# Patient Record
Sex: FEMALE | Race: WHITE | URBAN - METROPOLITAN AREA
[De-identification: names, ages, dates, MRNs, and addresses within clinical notes are randomized per-mention and may not be internally consistent; named-entity substitution may affect disease eponyms.]

---

## 2023-08-28 ENCOUNTER — HOSPITAL ENCOUNTER (INPATIENT)
Age: 58
LOS: 11 days | Discharge: HOME | End: 2023-09-08
Attending: INTERNAL MEDICINE
Payer: MEDICARE

## 2023-08-28 DIAGNOSIS — K65.1 ABSCESS OF ABDOMINAL CAVITY (HCC): Primary | ICD-10-CM

## 2023-08-28 PROBLEM — A41.9 SEPSIS (HCC): Status: ACTIVE | Noted: 2023-08-28

## 2023-08-28 PROCEDURE — 2060000000 HC ICU INTERMEDIATE R&B

## 2023-08-28 RX ORDER — HYDROXYZINE HYDROCHLORIDE 10 MG/1
25 TABLET, FILM COATED ORAL EVERY 8 HOURS PRN
Status: DISCONTINUED | OUTPATIENT
Start: 2023-08-28 | End: 2023-09-08 | Stop reason: HOSPADM

## 2023-08-28 RX ORDER — ACETAMINOPHEN 650 MG/1
650 SUPPOSITORY RECTAL EVERY 6 HOURS PRN
Status: DISCONTINUED | OUTPATIENT
Start: 2023-08-28 | End: 2023-09-08 | Stop reason: HOSPADM

## 2023-08-28 RX ORDER — SODIUM CHLORIDE 9 MG/ML
INJECTION, SOLUTION INTRAVENOUS CONTINUOUS
Status: DISCONTINUED | OUTPATIENT
Start: 2023-08-28 | End: 2023-08-30

## 2023-08-28 RX ORDER — CEFAZOLIN SODIUM IN 0.9 % NACL 2 G/100 ML
2000 PLASTIC BAG, INJECTION (ML) INTRAVENOUS EVERY 8 HOURS
Status: DISCONTINUED | OUTPATIENT
Start: 2023-08-29 | End: 2023-09-08 | Stop reason: HOSPADM

## 2023-08-28 RX ORDER — VENLAFAXINE HYDROCHLORIDE 150 MG/1
150 CAPSULE, EXTENDED RELEASE ORAL DAILY
COMMUNITY

## 2023-08-28 RX ORDER — METRONIDAZOLE 500 MG/100ML
500 INJECTION, SOLUTION INTRAVENOUS EVERY 8 HOURS
Status: DISCONTINUED | OUTPATIENT
Start: 2023-08-29 | End: 2023-08-31

## 2023-08-28 RX ORDER — TOPIRAMATE 100 MG/1
100 TABLET, FILM COATED ORAL 2 TIMES DAILY
Status: DISCONTINUED | OUTPATIENT
Start: 2023-08-29 | End: 2023-09-08 | Stop reason: HOSPADM

## 2023-08-28 RX ORDER — SODIUM CHLORIDE 0.9 % (FLUSH) 0.9 %
5-40 SYRINGE (ML) INJECTION PRN
Status: DISCONTINUED | OUTPATIENT
Start: 2023-08-28 | End: 2023-09-08 | Stop reason: HOSPADM

## 2023-08-28 RX ORDER — PROCHLORPERAZINE EDISYLATE 5 MG/ML
10 INJECTION INTRAMUSCULAR; INTRAVENOUS EVERY 6 HOURS PRN
Status: DISCONTINUED | OUTPATIENT
Start: 2023-08-28 | End: 2023-09-08 | Stop reason: HOSPADM

## 2023-08-28 RX ORDER — SODIUM CHLORIDE 9 MG/ML
INJECTION, SOLUTION INTRAVENOUS PRN
Status: DISCONTINUED | OUTPATIENT
Start: 2023-08-28 | End: 2023-09-08 | Stop reason: HOSPADM

## 2023-08-28 RX ORDER — HYDROXYZINE HYDROCHLORIDE 25 MG/1
25 TABLET, FILM COATED ORAL EVERY 8 HOURS PRN
COMMUNITY

## 2023-08-28 RX ORDER — ATORVASTATIN CALCIUM 10 MG/1
20 TABLET, FILM COATED ORAL DAILY
Status: DISCONTINUED | OUTPATIENT
Start: 2023-08-29 | End: 2023-09-08 | Stop reason: HOSPADM

## 2023-08-28 RX ORDER — PANTOPRAZOLE SODIUM 40 MG/1
40 TABLET, DELAYED RELEASE ORAL DAILY
COMMUNITY

## 2023-08-28 RX ORDER — ENOXAPARIN SODIUM 100 MG/ML
40 INJECTION SUBCUTANEOUS DAILY
Status: DISCONTINUED | OUTPATIENT
Start: 2023-08-29 | End: 2023-08-29

## 2023-08-28 RX ORDER — ATORVASTATIN CALCIUM 20 MG/1
20 TABLET, FILM COATED ORAL DAILY
COMMUNITY

## 2023-08-28 RX ORDER — VENLAFAXINE HYDROCHLORIDE 150 MG/1
150 CAPSULE, EXTENDED RELEASE ORAL NIGHTLY
Status: DISCONTINUED | OUTPATIENT
Start: 2023-08-29 | End: 2023-09-08 | Stop reason: HOSPADM

## 2023-08-28 RX ORDER — SODIUM CHLORIDE 0.9 % (FLUSH) 0.9 %
5-40 SYRINGE (ML) INJECTION EVERY 12 HOURS SCHEDULED
Status: DISCONTINUED | OUTPATIENT
Start: 2023-08-28 | End: 2023-09-08 | Stop reason: HOSPADM

## 2023-08-28 RX ORDER — ACETAMINOPHEN 325 MG/1
650 TABLET ORAL EVERY 6 HOURS PRN
Status: DISCONTINUED | OUTPATIENT
Start: 2023-08-28 | End: 2023-09-08 | Stop reason: HOSPADM

## 2023-08-28 RX ORDER — TOPIRAMATE 100 MG/1
100 TABLET, FILM COATED ORAL 2 TIMES DAILY
COMMUNITY

## 2023-08-28 RX ORDER — PANTOPRAZOLE SODIUM 40 MG/10ML
40 INJECTION, POWDER, LYOPHILIZED, FOR SOLUTION INTRAVENOUS DAILY
Status: DISCONTINUED | OUTPATIENT
Start: 2023-08-28 | End: 2023-09-08 | Stop reason: HOSPADM

## 2023-08-28 RX ORDER — HYDROXYCHLOROQUINE SULFATE 200 MG/1
200 TABLET, FILM COATED ORAL 2 TIMES DAILY
COMMUNITY

## 2023-08-28 RX ORDER — FENTANYL CITRATE 50 UG/ML
50 INJECTION, SOLUTION INTRAMUSCULAR; INTRAVENOUS
Status: DISCONTINUED | OUTPATIENT
Start: 2023-08-28 | End: 2023-09-08 | Stop reason: HOSPADM

## 2023-08-28 ASSESSMENT — PAIN DESCRIPTION - LOCATION: LOCATION: ABDOMEN

## 2023-08-28 ASSESSMENT — PAIN SCALES - GENERAL: PAINLEVEL_OUTOF10: 7

## 2023-08-28 ASSESSMENT — PAIN DESCRIPTION - PAIN TYPE: TYPE: ACUTE PAIN

## 2023-08-29 ENCOUNTER — APPOINTMENT (OUTPATIENT)
Dept: CT IMAGING | Age: 58
End: 2023-08-29
Attending: INTERNAL MEDICINE
Payer: MEDICARE

## 2023-08-29 PROBLEM — K65.1 ABSCESS OF ABDOMINAL CAVITY (HCC): Status: ACTIVE | Noted: 2023-08-29

## 2023-08-29 LAB
ALBUMIN SERPL-MCNC: 2.7 G/DL (ref 3.4–5)
ALBUMIN SERPL-MCNC: 2.7 G/DL (ref 3.4–5)
ALBUMIN/GLOB SERPL: 0.9 {RATIO} (ref 1.1–2.2)
ALBUMIN/GLOB SERPL: 0.9 {RATIO} (ref 1.1–2.2)
ALP SERPL-CCNC: 83 U/L (ref 40–129)
ALP SERPL-CCNC: 90 U/L (ref 40–129)
ALT SERPL-CCNC: 7 U/L (ref 10–40)
ALT SERPL-CCNC: 8 U/L (ref 10–40)
ANION GAP SERPL CALCULATED.3IONS-SCNC: 10 MMOL/L (ref 3–16)
ANION GAP SERPL CALCULATED.3IONS-SCNC: 11 MMOL/L (ref 3–16)
AST SERPL-CCNC: 10 U/L (ref 15–37)
AST SERPL-CCNC: 11 U/L (ref 15–37)
BASOPHILS # BLD: 0.1 K/UL (ref 0–0.2)
BASOPHILS NFR BLD: 0.9 %
BILIRUB SERPL-MCNC: 0.4 MG/DL (ref 0–1)
BILIRUB SERPL-MCNC: 0.5 MG/DL (ref 0–1)
BUN SERPL-MCNC: 8 MG/DL (ref 7–20)
BUN SERPL-MCNC: 9 MG/DL (ref 7–20)
CALCIUM SERPL-MCNC: 8.2 MG/DL (ref 8.3–10.6)
CALCIUM SERPL-MCNC: 8.5 MG/DL (ref 8.3–10.6)
CHLORIDE SERPL-SCNC: 110 MMOL/L (ref 99–110)
CHLORIDE SERPL-SCNC: 112 MMOL/L (ref 99–110)
CO2 SERPL-SCNC: 18 MMOL/L (ref 21–32)
CO2 SERPL-SCNC: 20 MMOL/L (ref 21–32)
CREAT SERPL-MCNC: 0.6 MG/DL (ref 0.6–1.1)
CREAT SERPL-MCNC: <0.5 MG/DL (ref 0.6–1.1)
DEPRECATED RDW RBC AUTO: 13.4 % (ref 12.4–15.4)
EOSINOPHIL # BLD: 0.1 K/UL (ref 0–0.6)
EOSINOPHIL NFR BLD: 0.5 %
FOLATE SERPL-MCNC: 11.03 NG/ML (ref 4.78–24.2)
GFR SERPLBLD CREATININE-BSD FMLA CKD-EPI: >60 ML/MIN/{1.73_M2}
GFR SERPLBLD CREATININE-BSD FMLA CKD-EPI: >60 ML/MIN/{1.73_M2}
GLUCOSE SERPL-MCNC: 79 MG/DL (ref 70–99)
GLUCOSE SERPL-MCNC: 82 MG/DL (ref 70–99)
HCT VFR BLD AUTO: 29.9 % (ref 36–48)
HCT VFR BLD AUTO: 30 % (ref 36–48)
HCT VFR BLD AUTO: 30.3 % (ref 36–48)
HCT VFR BLD AUTO: 31.5 % (ref 36–48)
HCT VFR BLD AUTO: 31.9 % (ref 36–48)
HGB BLD-MCNC: 10 G/DL (ref 12–16)
HGB BLD-MCNC: 10.2 G/DL (ref 12–16)
HGB BLD-MCNC: 10.8 G/DL (ref 12–16)
INR PPP: 1.36 (ref 0.84–1.16)
LACTATE BLDV-SCNC: 0.8 MMOL/L (ref 0.4–1.9)
LACTATE BLDV-SCNC: 1.2 MMOL/L (ref 0.4–1.9)
LYMPHOCYTES # BLD: 0.6 K/UL (ref 1–5.1)
LYMPHOCYTES NFR BLD: 4 %
MCH RBC QN AUTO: 35.1 PG (ref 26–34)
MCHC RBC AUTO-ENTMCNC: 33.5 G/DL (ref 31–36)
MCV RBC AUTO: 104.9 FL (ref 80–100)
MONOCYTES # BLD: 0.5 K/UL (ref 0–1.3)
MONOCYTES NFR BLD: 3.7 %
NEUTROPHILS # BLD: 13 K/UL (ref 1.7–7.7)
NEUTROPHILS NFR BLD: 90.9 %
PLATELET # BLD AUTO: 274 K/UL (ref 135–450)
PMV BLD AUTO: 7.9 FL (ref 5–10.5)
POTASSIUM SERPL-SCNC: 3.6 MMOL/L (ref 3.5–5.1)
POTASSIUM SERPL-SCNC: 3.6 MMOL/L (ref 3.5–5.1)
PROCALCITONIN SERPL IA-MCNC: 0.27 NG/ML (ref 0–0.15)
PROT SERPL-MCNC: 5.7 G/DL (ref 6.4–8.2)
PROT SERPL-MCNC: 5.7 G/DL (ref 6.4–8.2)
PROTHROMBIN TIME: 16.7 SEC (ref 11.5–14.8)
RBC # BLD AUTO: 2.85 M/UL (ref 4–5.2)
SODIUM SERPL-SCNC: 139 MMOL/L (ref 136–145)
SODIUM SERPL-SCNC: 142 MMOL/L (ref 136–145)
VIT B12 SERPL-MCNC: >2000 PG/ML (ref 211–911)
WBC # BLD AUTO: 14.3 K/UL (ref 4–11)

## 2023-08-29 PROCEDURE — 6360000002 HC RX W HCPCS: Performed by: NURSE PRACTITIONER

## 2023-08-29 PROCEDURE — 85025 COMPLETE CBC W/AUTO DIFF WBC: CPT

## 2023-08-29 PROCEDURE — 87040 BLOOD CULTURE FOR BACTERIA: CPT

## 2023-08-29 PROCEDURE — 87070 CULTURE OTHR SPECIMN AEROBIC: CPT

## 2023-08-29 PROCEDURE — 99152 MOD SED SAME PHYS/QHP 5/>YRS: CPT

## 2023-08-29 PROCEDURE — 6360000004 HC RX CONTRAST MEDICATION

## 2023-08-29 PROCEDURE — 87075 CULTR BACTERIA EXCEPT BLOOD: CPT

## 2023-08-29 PROCEDURE — 80053 COMPREHEN METABOLIC PANEL: CPT

## 2023-08-29 PROCEDURE — 77012 CT SCAN FOR NEEDLE BIOPSY: CPT

## 2023-08-29 PROCEDURE — 82607 VITAMIN B-12: CPT

## 2023-08-29 PROCEDURE — 2060000000 HC ICU INTERMEDIATE R&B

## 2023-08-29 PROCEDURE — C9113 INJ PANTOPRAZOLE SODIUM, VIA: HCPCS | Performed by: NURSE PRACTITIONER

## 2023-08-29 PROCEDURE — 6360000004 HC RX CONTRAST MEDICATION: Performed by: EMERGENCY MEDICINE

## 2023-08-29 PROCEDURE — 87077 CULTURE AEROBIC IDENTIFY: CPT

## 2023-08-29 PROCEDURE — 87205 SMEAR GRAM STAIN: CPT

## 2023-08-29 PROCEDURE — 85014 HEMATOCRIT: CPT

## 2023-08-29 PROCEDURE — 0W9G3ZX DRAINAGE OF PERITONEAL CAVITY, PERCUTANEOUS APPROACH, DIAGNOSTIC: ICD-10-PCS | Performed by: STUDENT IN AN ORGANIZED HEALTH CARE EDUCATION/TRAINING PROGRAM

## 2023-08-29 PROCEDURE — 83605 ASSAY OF LACTIC ACID: CPT

## 2023-08-29 PROCEDURE — 85610 PROTHROMBIN TIME: CPT

## 2023-08-29 PROCEDURE — 2580000003 HC RX 258: Performed by: NURSE PRACTITIONER

## 2023-08-29 PROCEDURE — 36415 COLL VENOUS BLD VENIPUNCTURE: CPT

## 2023-08-29 PROCEDURE — 6370000000 HC RX 637 (ALT 250 FOR IP): Performed by: NURSE PRACTITIONER

## 2023-08-29 PROCEDURE — 74177 CT ABD & PELVIS W/CONTRAST: CPT

## 2023-08-29 PROCEDURE — 84145 PROCALCITONIN (PCT): CPT

## 2023-08-29 PROCEDURE — 85018 HEMOGLOBIN: CPT

## 2023-08-29 PROCEDURE — 99222 1ST HOSP IP/OBS MODERATE 55: CPT | Performed by: SURGERY

## 2023-08-29 PROCEDURE — 6360000002 HC RX W HCPCS: Performed by: STUDENT IN AN ORGANIZED HEALTH CARE EDUCATION/TRAINING PROGRAM

## 2023-08-29 PROCEDURE — 82746 ASSAY OF FOLIC ACID SERUM: CPT

## 2023-08-29 PROCEDURE — 6360000002 HC RX W HCPCS: Performed by: INTERNAL MEDICINE

## 2023-08-29 PROCEDURE — 86403 PARTICLE AGGLUT ANTBDY SCRN: CPT

## 2023-08-29 PROCEDURE — 87186 SC STD MICRODIL/AGAR DIL: CPT

## 2023-08-29 RX ORDER — KETOROLAC TROMETHAMINE 30 MG/ML
15 INJECTION, SOLUTION INTRAMUSCULAR; INTRAVENOUS ONCE
Status: COMPLETED | OUTPATIENT
Start: 2023-08-29 | End: 2023-08-29

## 2023-08-29 RX ORDER — OXYCODONE HYDROCHLORIDE AND ACETAMINOPHEN 5; 325 MG/1; MG/1
1 TABLET ORAL EVERY 4 HOURS PRN
Status: DISCONTINUED | OUTPATIENT
Start: 2023-08-29 | End: 2023-09-08 | Stop reason: HOSPADM

## 2023-08-29 RX ORDER — MIDAZOLAM HYDROCHLORIDE 1 MG/ML
INJECTION INTRAMUSCULAR; INTRAVENOUS PRN
Status: COMPLETED | OUTPATIENT
Start: 2023-08-29 | End: 2023-08-29

## 2023-08-29 RX ORDER — FENTANYL CITRATE 50 UG/ML
INJECTION, SOLUTION INTRAMUSCULAR; INTRAVENOUS PRN
Status: COMPLETED | OUTPATIENT
Start: 2023-08-29 | End: 2023-08-29

## 2023-08-29 RX ADMIN — METRONIDAZOLE 500 MG: 500 INJECTION, SOLUTION INTRAVENOUS at 00:21

## 2023-08-29 RX ADMIN — VENLAFAXINE HYDROCHLORIDE 150 MG: 150 CAPSULE, EXTENDED RELEASE ORAL at 20:34

## 2023-08-29 RX ADMIN — MIDAZOLAM 0.5 MG: 1 INJECTION INTRAMUSCULAR; INTRAVENOUS at 16:00

## 2023-08-29 RX ADMIN — ACETAMINOPHEN 650 MG: 325 TABLET ORAL at 04:09

## 2023-08-29 RX ADMIN — KETOROLAC TROMETHAMINE 15 MG: 30 INJECTION, SOLUTION INTRAMUSCULAR at 08:10

## 2023-08-29 RX ADMIN — Medication 10 ML: at 00:05

## 2023-08-29 RX ADMIN — METRONIDAZOLE 500 MG: 500 INJECTION, SOLUTION INTRAVENOUS at 23:50

## 2023-08-29 RX ADMIN — DIATRIZOATE MEGLUMINE AND DIATRIZOATE SODIUM 12 ML: 660; 100 LIQUID ORAL; RECTAL at 09:30

## 2023-08-29 RX ADMIN — IOPAMIDOL 75 ML: 755 INJECTION, SOLUTION INTRAVENOUS at 11:14

## 2023-08-29 RX ADMIN — OXYCODONE AND ACETAMINOPHEN 1 TABLET: 5; 325 TABLET ORAL at 04:09

## 2023-08-29 RX ADMIN — CEFAZOLIN 2000 MG: 10 INJECTION, POWDER, FOR SOLUTION INTRAVENOUS at 01:41

## 2023-08-29 RX ADMIN — SODIUM CHLORIDE, PRESERVATIVE FREE 10 ML: 5 INJECTION INTRAVENOUS at 00:03

## 2023-08-29 RX ADMIN — CEFAZOLIN 2000 MG: 10 INJECTION, POWDER, FOR SOLUTION INTRAVENOUS at 17:40

## 2023-08-29 RX ADMIN — PANTOPRAZOLE SODIUM 40 MG: 40 INJECTION, POWDER, FOR SOLUTION INTRAVENOUS at 09:35

## 2023-08-29 RX ADMIN — SODIUM CHLORIDE: 9 INJECTION, SOLUTION INTRAVENOUS at 00:05

## 2023-08-29 RX ADMIN — SODIUM CHLORIDE, PRESERVATIVE FREE 10 ML: 5 INJECTION INTRAVENOUS at 09:48

## 2023-08-29 RX ADMIN — ATORVASTATIN CALCIUM 20 MG: 10 TABLET, FILM COATED ORAL at 09:40

## 2023-08-29 RX ADMIN — FENTANYL CITRATE 25 MCG: 50 INJECTION, SOLUTION INTRAMUSCULAR; INTRAVENOUS at 16:00

## 2023-08-29 RX ADMIN — PANTOPRAZOLE SODIUM 40 MG: 40 INJECTION, POWDER, FOR SOLUTION INTRAVENOUS at 00:05

## 2023-08-29 RX ADMIN — PROCHLORPERAZINE EDISYLATE 10 MG: 5 INJECTION INTRAMUSCULAR; INTRAVENOUS at 23:52

## 2023-08-29 RX ADMIN — METRONIDAZOLE 500 MG: 500 INJECTION, SOLUTION INTRAVENOUS at 09:36

## 2023-08-29 RX ADMIN — METRONIDAZOLE 500 MG: 500 INJECTION, SOLUTION INTRAVENOUS at 17:42

## 2023-08-29 RX ADMIN — FENTANYL CITRATE 50 MCG: 50 INJECTION INTRAMUSCULAR; INTRAVENOUS at 01:45

## 2023-08-29 RX ADMIN — TOPIRAMATE 100 MG: 100 TABLET, FILM COATED ORAL at 00:07

## 2023-08-29 RX ADMIN — CEFAZOLIN 2000 MG: 10 INJECTION, POWDER, FOR SOLUTION INTRAVENOUS at 23:51

## 2023-08-29 RX ADMIN — OXYCODONE AND ACETAMINOPHEN 1 TABLET: 5; 325 TABLET ORAL at 17:48

## 2023-08-29 RX ADMIN — TOPIRAMATE 100 MG: 100 TABLET, FILM COATED ORAL at 20:34

## 2023-08-29 RX ADMIN — CEFAZOLIN 2000 MG: 10 INJECTION, POWDER, FOR SOLUTION INTRAVENOUS at 09:48

## 2023-08-29 RX ADMIN — OXYCODONE AND ACETAMINOPHEN 1 TABLET: 5; 325 TABLET ORAL at 09:40

## 2023-08-29 RX ADMIN — MIDAZOLAM 0.5 MG: 1 INJECTION INTRAMUSCULAR; INTRAVENOUS at 16:04

## 2023-08-29 RX ADMIN — TOPIRAMATE 100 MG: 100 TABLET, FILM COATED ORAL at 09:40

## 2023-08-29 RX ADMIN — SODIUM CHLORIDE: 9 INJECTION, SOLUTION INTRAVENOUS at 14:58

## 2023-08-29 RX ADMIN — FENTANYL CITRATE 25 MCG: 50 INJECTION, SOLUTION INTRAMUSCULAR; INTRAVENOUS at 16:03

## 2023-08-29 RX ADMIN — VENLAFAXINE HYDROCHLORIDE 150 MG: 150 CAPSULE, EXTENDED RELEASE ORAL at 00:07

## 2023-08-29 ASSESSMENT — PAIN DESCRIPTION - ORIENTATION
ORIENTATION: RIGHT;LOWER

## 2023-08-29 ASSESSMENT — PAIN SCALES - GENERAL
PAINLEVEL_OUTOF10: 7
PAINLEVEL_OUTOF10: 10
PAINLEVEL_OUTOF10: 5
PAINLEVEL_OUTOF10: 8
PAINLEVEL_OUTOF10: 10
PAINLEVEL_OUTOF10: 8
PAINLEVEL_OUTOF10: 8

## 2023-08-29 ASSESSMENT — PAIN DESCRIPTION - LOCATION
LOCATION: ABDOMEN

## 2023-08-29 ASSESSMENT — PAIN DESCRIPTION - FREQUENCY: FREQUENCY: CONTINUOUS

## 2023-08-29 ASSESSMENT — PAIN DESCRIPTION - PAIN TYPE
TYPE: ACUTE PAIN

## 2023-08-29 ASSESSMENT — PAIN DESCRIPTION - ONSET: ONSET: ON-GOING

## 2023-08-29 ASSESSMENT — PAIN DESCRIPTION - DESCRIPTORS: DESCRIPTORS: SHARP

## 2023-08-29 NOTE — CONSULTS
Department of General Surgery Consult    PATIENT NAME: Tamra Ellington   YOB: 1965    ADMISSION DATE: 8/28/2023 10:49 PM      TODAY'S DATE: 8/29/2023    Reason for Consult:  Possible abdominal abscess    Chief Complaint: RLQ abdominal pain    Historian: patient    Requesting Physician:  Corrine    HISTORY OF PRESENT ILLNESS:              The patient is a 63 y.o. female who presents with 3 day h/o lower abdominal pain. Pain has been centered in RLQ with radiation along the flank/RUQ. Has been nauseated, anorexic but no fever, chills, emesis or jaundice. No prior h/o similar pain. Patient had EDISON/BSO >15 years ago, also lap irvin, and gastric sleeve. States she was treated for \"pancreatitis\" just a few weeks ago. Seen in ER at [facility] yesterday - workup showed mild/mod leukocytosis, and CT showed apparent cystic structure in RLQ without a grossly visible appendix. Patient continues to complain of pain today.     Past Medical History:        Diagnosis Date    Anxiety     Chronic recurrent pancreatitis ([facility])     Depression     Fibromyalgia     Lupus ([address])     Osteoarthritis        Past Surgical History:        Procedure Laterality Date    CHOLECYSTECTOMY      HYSTERECTOMY (CERVIX STATUS UNKNOWN)      JOINT REPLACEMENT         Current Medications:   Current Facility-Administered Medications: oxyCODONE-acetaminophen (PERCOCET) 5-325 MG per tablet 1 tablet, 1 tablet, Oral, Q4H PRN  sodium chloride flush 0.9 % injection 5-40 mL, 5-40 mL, IntraVENous, 2 times per day  sodium chloride flush 0.9 % injection 5-40 mL, 5-40 mL, IntraVENous, PRN  0.9 % sodium chloride infusion, , IntraVENous, PRN  acetaminophen (TYLENOL) tablet 650 mg, 650 mg, Oral, Q6H PRN **OR** acetaminophen (TYLENOL) suppository 650 mg, 650 mg, Rectal, Q6H PRN  0.9 % sodium chloride infusion, , IntraVENous, Continuous  pantoprazole (PROTONIX) injection 40 mg, 40 mg, IntraVENous, Daily  fentaNYL (SUBLIMAZE) injection 50 mcg, 50

## 2023-08-29 NOTE — OR NURSING
Image guided abscess aspiration RLQ completed. Dr. Mik Rangel  25 milliliters of tain colored withdrawn immediately. Specimen sent to lab for culture. dressing clean, dry, and intact. Pt tolerated procedure without any signs or symptoms of distress. Vital signs stable. Report called to RN. Pt transported back to 421 in stable condition via bed. Medications  Versed: 1 mg  Fentanyl: 50 mcg    Vital Signs  Vitals:    08/29/23 1610   BP: 131/66   Pulse: 83   Resp: 22   Temp:    SpO2: 99%    (vital signs in table format)    Post Fartun  2 - Able to move 4 extremities voluntarily on command  2 - BP+/- 20mmHg of normal  2 - Fully awake  2 - Able to maintain oxygen saturation >92% on room air  2 - Able to breathe deeply and cough freely    This RN wasted the following with Kasandra.   1 mg Versed  50 mcg Fentanyl

## 2023-08-29 NOTE — BRIEF OP NOTE
Brief Postoperative Note    Patient: Ammon Broussard  YOB: 1965  MRN: [id number]      Pre-operative Diagnosis: RLQ abscess    Post-operative Diagnosis: Same    Procedure: CT guided fluid aspiration    Anesthesia: Local and Moderate Sedation    Surgeons/Assistants: Joey Tompkins DO    Estimated Blood Loss: less than 50     Complications: None    Specimens: Was Obtained: 25 mL of creamy purulent fluid, sent for culture    Findings:   Successful CT guided RLQ fluid aspiration, the cavity was completely evacuated of fluid, therefore no drain was left behind. Findings relayed to the patient and referring provider ( Dr Christiano Treadwell).        Joey Tompkins DO  8/29/2023, 4:15 PM  Interventional Radiology  [phone] ([phone])

## 2023-08-29 NOTE — PROGRESS NOTES
Hospital Medicine Progress Note      Date of Admission: 8/28/2023  Hospital Day: 2    Chief Admission Complaint:  Direct admit from [facility]); pelvic abscess        Subjective:   notes ongoing abd pain, going down for CT now    Presenting Admission History:       Ana Britt is a/an 63 y.o. female with a significant past medical history of GERD, lupus, fibromyalgia, hyperlipidemia, with chronic recurrent pancreatitis last known episode about 3 weeks ago presents to [facility] as a direct admit from [facility]) after presenting there earlier today with complaint of right lower quadrant pain for the last 2 days. She notes the pain is severe, 8 out of 10, sharp stabbing pain worse with movement, associated with chills and body aches yesterday and today. She notes nausea without any vomiting, no diarrhea. She notes chronic constipation with last bowel movement yesterday that was soft, light brown without visualized blood. She denies any prior vaginal bleeding or discharge before onset of this RLQ pain. She had a follow-up appointment with her PCP today for inpatient stay at [facility] 2 weeks ago for pancreatitis, provider noted significant right lower quadrant pain and sent her back to [facility] emergency department for suspected appendicitis. Her evaluation included laboratory studies and CT of the abdomen pelvis. CT there did not show any acute hepatitis but did show a cystic structure in the pelvic inlet on the right anterior of the iliac vessels with some dependent free fluid; concerning for ovarian cysts remnants, measuring 4.7 cm. Pertinent laboratory studies includes cell count with a WBC of 18.3, hemoglobin 11.8, MCH 34. Urinalysis was negative for acute findings. INR is 1.1. Chemistry panel showed sodium 134, creatinine 0.9, blood sugar 116.   Patient was treated with ertapenem IV piggyback in the emergency ---------------------------------------------------------------------  B. Risk of Treatment (any 1)   [] Drugs/treatments that require intensive monitoring for toxicity include:    [] Change in code status:    [] Decision to escalate care:    [] Major surgery/procedure with associated risk factors:    ----------------------------------------------------------------------  C. Data (any 2)  [x] Discussed management of the case with:  gen surg, pt's nurse  [] Imaging personally reviewed and interpreted, includes:    [x] Data Review (any 3)  [] Collateral history obtained from:    [x] All available Consultant notes from yesterday/today were reviewed  [x] All current labs were reviewed and interpreted for clinical significance   [x] Appropriate follow-up labs were ordered    Medications:  Personally reviewed in detail in conjunction w/ labs as documented for evidence of drug toxicity. Infusion Medications    sodium chloride      sodium chloride 100 mL/hr at 08/29/23 1458     Scheduled Medications    sodium chloride flush  5-40 mL IntraVENous 2 times per day    pantoprazole  40 mg IntraVENous Daily    ceFAZolin  2,000 mg IntraVENous Q8H    metroNIDAZOLE  500 mg IntraVENous Q8H    atorvastatin  20 mg Oral Daily    topiramate  100 mg Oral BID    venlafaxine  150 mg Oral Nightly     PRN Meds: oxyCODONE-acetaminophen, sodium chloride flush, sodium chloride, acetaminophen **OR** acetaminophen, fentanNYL, prochlorperazine, hydrOXYzine HCl     Labs:  Personally reviewed and interpreted for clinical significance. Recent Labs     08/29/23  0004 08/29/23  0434 08/29/23  0932 08/29/23  1434   WBC 14.3*  --   --   --    HGB 10.0* 10.2* 10.2* 10.8*   HCT 29.9* 31.5* 30.0* 31.9*     --   --   --      Recent Labs     08/29/23  0004 08/29/23  0435    142   K 3.6 3.6    112*   CO2 18* 20*   BUN 9 8   CREATININE <0.5* 0.6   CALCIUM 8.2* 8.5     No results for input(s): PROBNP, TROPHS in the last 72 hours.   No

## 2023-08-29 NOTE — PLAN OF CARE
Problem: Discharge Planning  Goal: Discharge to home or other facility with appropriate resources  Outcome: Progressing     Problem: Pain  Goal: Verbalizes/displays adequate comfort level or baseline comfort level  Outcome: Progressing     Problem: Cardiovascular - Adult  Goal: Maintains optimal cardiac output and hemodynamic stability  Outcome: Progressing     Problem: Musculoskeletal - Adult  Goal: Return mobility to safest level of function  Outcome: Progressing  Goal: Return ADL status to a safe level of function  Outcome: Progressing

## 2023-08-29 NOTE — OR NURSING
Pt arrived for image guided abscess drain insertion RLQ . Procedure explained including the risk and benefits of the procedure. All questions answered. Pt verbalizes understanding of the procedure and states no more questions. Consent confirmed. Vital signs stable. Labs, allergies, medications, and code status reviewed. No contraindications noted. Vital Signs  Vitals:    08/29/23 1557   BP: 130/68   Pulse: 82   Resp: 17   Temp:    SpO2: 100%    (vital signs in table format)    Pre Fartun Score  2 - Able to move 4 extremities voluntarily on command  2 - BP+/- 20mmHg of normal  2 - Fully awake  2 - Able to maintain oxygen saturation >92% on room air  2 - Able to breathe deeply and cough freely    Allergies  Patient has no known allergies.  (allergies)    Labs  Lab Results   Component Value Date    INR 1.36 (H) 08/29/2023    PROTIME 16.7 (H) 08/29/2023     Lab Results   Component Value Date    CREATININE 0.6 08/29/2023    BUN 8 08/29/2023     08/29/2023    K 3.6 08/29/2023     (H) 08/29/2023    CO2 20 (L) 08/29/2023     Lab Results   Component Value Date    WBC 14.3 (H) 08/29/2023    HGB 10.8 (L) 08/29/2023    HCT 31.9 (L) 08/29/2023    .9 (H) 08/29/2023     08/29/2023

## 2023-08-29 NOTE — H&P
Hospital Medicine History & Physical        Date of Service: 8/28/2023    Time of Service: 2315    Disposition:    [x]Admitted to inpatient status with expected LOS greater than two midnights due to medical therapy. []Placed in observation status. Chief Admission Complaint:  Direct admit from [facility]); pelvic abscess    Presenting Admission History:      Alecia Escobedo is a/an 63 y.o. female with a significant past medical history of GERD, lupus, fibromyalgia, hyperlipidemia, with chronic recurrent pancreatitis last known episode about 3 weeks ago presents to [facility] as a direct admit from [facility]) after presenting there earlier today with complaint of right lower quadrant pain for the last 2 days. She notes the pain is severe, 8 out of 10, sharp stabbing pain worse with movement, associated with chills and body aches yesterday and today. She notes nausea without any vomiting, no diarrhea. She notes chronic constipation with last bowel movement yesterday that was soft, light brown without visualized blood. She denies any prior vaginal bleeding or discharge before onset of this RLQ pain. She had a follow-up appointment with her PCP today for inpatient stay at [facility] 2 weeks ago for pancreatitis, provider noted significant right lower quadrant pain and sent her back to [facility] emergency department for suspected appendicitis. Her evaluation included laboratory studies and CT of the abdomen pelvis. CT there did not show any acute hepatitis but did show a cystic structure in the pelvic inlet on the right anterior of the iliac vessels with some dependent free fluid; concerning for ovarian cysts remnants, measuring 4.7 cm. Pertinent laboratory studies includes cell count with a WBC of 18.3, hemoglobin 11.8, MCH 34. Urinalysis was negative for acute findings. INR is 1.1.   Chemistry panel showed sodium 134, creatinine [x]No    --------------------------------------------------------------------------------------------------------------------------------------------------------------------    Imaging:     No results found. PCP: Referring Not In System (Inactive)    Past Medical History:        Diagnosis Date    Anxiety     Chronic recurrent pancreatitis ([address])     Depression     Fibromyalgia     Lupus ([address])     Osteoarthritis        Past Surgical History:        Procedure Laterality Date    CHOLECYSTECTOMY      HYSTERECTOMY (CERVIX STATUS UNKNOWN)      JOINT REPLACEMENT         Medications Prior to Admission:   Prior to Admission medications    Medication Sig Start Date End Date Taking? Authorizing Provider   atorvastatin (LIPITOR) 20 MG tablet Take 1 tablet by mouth daily   Yes Historical Provider, MD   venlafaxine (EFFEXOR XR) 150 MG extended release capsule Take 1 capsule by mouth daily   Yes Historical Provider, MD   hydroxychloroquine (PLAQUENIL) 200 MG tablet Take 1 tablet by mouth 2 times daily   Yes Historical Provider, MD   hydrOXYzine HCl (ATARAX) 25 MG tablet Take 1 tablet by mouth every 8 hours as needed for Itching or Anxiety   Yes Historical Provider, MD   pantoprazole (PROTONIX) 40 MG tablet Take 1 tablet by mouth daily   Yes Historical Provider, MD   topiramate (TOPAMAX) 100 MG tablet Take 1 tablet by mouth 2 times daily   Yes Historical Provider, MD       Labs: Personally reviewed and interpreted for clinical significance.    Recent Labs     08/29/23  0004   WBC 14.3*   HGB 10.0*   HCT 29.9*        Recent Labs     08/29/23  0004      K 3.6      CO2 18*   BUN 9   CREATININE <0.5*   CALCIUM 8.2*     Recent Labs     08/29/23  0004   AST 10*   ALT 7*   BILITOT 0.5   ALKPHOS 83       Anticipated co-signer, Dr. Gucci Ugarte, APRN - CNP

## 2023-08-29 NOTE — CARE COORDINATION
Direct admit from [facility] with pelvic abscess. Extensive health history of GERD, lupus, fibromyalgia, chronic recurrent pancreatitis. Lat episode of pancreatitis 3 weeks ago. General Surgery consult. Repeat CT scan with PO/IV contrast to better evaluate character and nature of cystic mass. Will await plan for any surgical intervention. On IVABX's Ancef Q8. No insurance listed which is a potential barrier to d/c if services needed. Referral to [address] [place] [address] counselor. CM following and will assist with barriers to d/c and needs that may arise No PCP listed. CM provided PCP list and information on [facility].

## 2023-08-30 LAB
ALBUMIN SERPL-MCNC: 2.7 G/DL (ref 3.4–5)
ALBUMIN/GLOB SERPL: 1 {RATIO} (ref 1.1–2.2)
ALP SERPL-CCNC: 110 U/L (ref 40–129)
ALT SERPL-CCNC: 10 U/L (ref 10–40)
ANION GAP SERPL CALCULATED.3IONS-SCNC: 10 MMOL/L (ref 3–16)
AST SERPL-CCNC: 23 U/L (ref 15–37)
BILIRUB SERPL-MCNC: <0.2 MG/DL (ref 0–1)
BUN SERPL-MCNC: 5 MG/DL (ref 7–20)
CALCIUM SERPL-MCNC: 8.1 MG/DL (ref 8.3–10.6)
CHLORIDE SERPL-SCNC: 110 MMOL/L (ref 99–110)
CO2 SERPL-SCNC: 20 MMOL/L (ref 21–32)
CREAT SERPL-MCNC: <0.5 MG/DL (ref 0.6–1.1)
GFR SERPLBLD CREATININE-BSD FMLA CKD-EPI: >60 ML/MIN/{1.73_M2}
GLUCOSE SERPL-MCNC: 79 MG/DL (ref 70–99)
MAGNESIUM SERPL-MCNC: 1.9 MG/DL (ref 1.8–2.4)
POTASSIUM SERPL-SCNC: 3.4 MMOL/L (ref 3.5–5.1)
PROT SERPL-MCNC: 5.5 G/DL (ref 6.4–8.2)
SODIUM SERPL-SCNC: 140 MMOL/L (ref 136–145)

## 2023-08-30 PROCEDURE — 2580000003 HC RX 258: Performed by: NURSE PRACTITIONER

## 2023-08-30 PROCEDURE — C9113 INJ PANTOPRAZOLE SODIUM, VIA: HCPCS | Performed by: NURSE PRACTITIONER

## 2023-08-30 PROCEDURE — 83735 ASSAY OF MAGNESIUM: CPT

## 2023-08-30 PROCEDURE — 36415 COLL VENOUS BLD VENIPUNCTURE: CPT

## 2023-08-30 PROCEDURE — 80053 COMPREHEN METABOLIC PANEL: CPT

## 2023-08-30 PROCEDURE — 6360000002 HC RX W HCPCS: Performed by: NURSE PRACTITIONER

## 2023-08-30 PROCEDURE — 2060000000 HC ICU INTERMEDIATE R&B

## 2023-08-30 PROCEDURE — 6370000000 HC RX 637 (ALT 250 FOR IP): Performed by: NURSE PRACTITIONER

## 2023-08-30 PROCEDURE — 99232 SBSQ HOSP IP/OBS MODERATE 35: CPT | Performed by: SURGERY

## 2023-08-30 RX ADMIN — SODIUM CHLORIDE: 9 INJECTION, SOLUTION INTRAVENOUS at 09:42

## 2023-08-30 RX ADMIN — ACETAMINOPHEN 650 MG: 325 TABLET ORAL at 09:32

## 2023-08-30 RX ADMIN — CEFAZOLIN 2000 MG: 10 INJECTION, POWDER, FOR SOLUTION INTRAVENOUS at 17:17

## 2023-08-30 RX ADMIN — METRONIDAZOLE 500 MG: 500 INJECTION, SOLUTION INTRAVENOUS at 09:46

## 2023-08-30 RX ADMIN — SODIUM CHLORIDE, PRESERVATIVE FREE 10 ML: 5 INJECTION INTRAVENOUS at 20:27

## 2023-08-30 RX ADMIN — VENLAFAXINE HYDROCHLORIDE 150 MG: 150 CAPSULE, EXTENDED RELEASE ORAL at 20:26

## 2023-08-30 RX ADMIN — ATORVASTATIN CALCIUM 20 MG: 10 TABLET, FILM COATED ORAL at 09:32

## 2023-08-30 RX ADMIN — METRONIDAZOLE 500 MG: 500 INJECTION, SOLUTION INTRAVENOUS at 17:14

## 2023-08-30 RX ADMIN — TOPIRAMATE 100 MG: 100 TABLET, FILM COATED ORAL at 20:26

## 2023-08-30 RX ADMIN — TOPIRAMATE 100 MG: 100 TABLET, FILM COATED ORAL at 09:33

## 2023-08-30 RX ADMIN — SODIUM CHLORIDE, PRESERVATIVE FREE 10 ML: 5 INJECTION INTRAVENOUS at 09:33

## 2023-08-30 RX ADMIN — CEFAZOLIN 2000 MG: 10 INJECTION, POWDER, FOR SOLUTION INTRAVENOUS at 09:43

## 2023-08-30 RX ADMIN — PANTOPRAZOLE SODIUM 40 MG: 40 INJECTION, POWDER, FOR SOLUTION INTRAVENOUS at 09:33

## 2023-08-30 ASSESSMENT — PAIN DESCRIPTION - LOCATION
LOCATION: HEAD
LOCATION: ABDOMEN
LOCATION: ABDOMEN

## 2023-08-30 ASSESSMENT — PAIN SCALES - GENERAL
PAINLEVEL_OUTOF10: 4
PAINLEVEL_OUTOF10: 3
PAINLEVEL_OUTOF10: 4

## 2023-08-30 NOTE — PLAN OF CARE
Problem: Discharge Planning  Goal: Discharge to home or other facility with appropriate resources  8/29/2023 2235 by Kate Cohn RN  Outcome: Progressing  8/29/2023 1953 by Paul Schafer RN  Outcome: Progressing     Problem: Pain  Goal: Verbalizes/displays adequate comfort level or baseline comfort level  8/29/2023 2235 by Kate Cohn RN  Outcome: Progressing  8/29/2023 1953 by Paul Schafer RN  Outcome: Progressing     Problem: Cardiovascular - Adult  Goal: Maintains optimal cardiac output and hemodynamic stability  8/29/2023 2235 by Kate Cohn RN  Outcome: Progressing  8/29/2023 1953 by Paul Schafer RN  Outcome: Progressing     Problem: Skin/Tissue Integrity - Adult  Goal: Skin integrity remains intact  8/29/2023 2235 by Kate Cohn RN  Outcome: Progressing  8/29/2023 1953 by Paul Schafer RN  Outcome: Progressing     Problem: Musculoskeletal - Adult  Goal: Return mobility to safest level of function  8/29/2023 2235 by Kate Cohn RN  Outcome: Progressing  8/29/2023 1953 by Paul Schafer RN  Outcome: Progressing  Goal: Return ADL status to a safe level of function  8/29/2023 2235 by Kate Cohn RN  Outcome: Progressing  8/29/2023 1953 by Paul Schafer RN  Outcome: Progressing     Problem: Gastrointestinal - Adult  Goal: Minimal or absence of nausea and vomiting  8/29/2023 2235 by Kate Cohn RN  Outcome: Progressing  8/29/2023 1953 by Paul Schafer RN  Outcome: Progressing  Goal: Maintains or returns to baseline bowel function  8/29/2023 2235 by Kate Cohn RN  Outcome: Progressing  8/29/2023 1953 by Paul Schafer RN  Outcome: Progressing  Goal: Maintains adequate nutritional intake  8/29/2023 2235 by Kate Cohn RN  Outcome: Progressing  8/29/2023 1953 by Paul Schafer RN  Outcome: Progressing     Problem: Infection - Adult  Goal: Absence of infection at discharge  8/29/2023 2235 by Kate Cohn RN  Outcome:

## 2023-08-30 NOTE — PROGRESS NOTES
08/30/23 1135   Encounter Summary   Encounter Overview/Reason  Spiritual/Emotional Needs   Service Provided For: Patient   Referral/Consult From: [address] Children;Family members;Parent   Last Encounter  08/30/23  (emotional support, nurtured hope)   Complexity of Encounter Low   Begin Time 1005   End Time  1015   Total Time Calculated 10 min   Spiritual/Emotional needs   Type Spiritual Support   Assessment/Intervention/Outcome   Assessment Calm;Coping   Intervention Active listening;Discussed belief system/[employer]/yvonne;Nurtured Hope   Outcome Comfort

## 2023-08-31 LAB
ALBUMIN SERPL-MCNC: 2.7 G/DL (ref 3.4–5)
ALBUMIN/GLOB SERPL: 1 {RATIO} (ref 1.1–2.2)
ALP SERPL-CCNC: 97 U/L (ref 40–129)
ALT SERPL-CCNC: 8 U/L (ref 10–40)
ANION GAP SERPL CALCULATED.3IONS-SCNC: 10 MMOL/L (ref 3–16)
AST SERPL-CCNC: 21 U/L (ref 15–37)
BILIRUB SERPL-MCNC: <0.2 MG/DL (ref 0–1)
BUN SERPL-MCNC: 3 MG/DL (ref 7–20)
CALCIUM SERPL-MCNC: 8.6 MG/DL (ref 8.3–10.6)
CHLORIDE SERPL-SCNC: 113 MMOL/L (ref 99–110)
CO2 SERPL-SCNC: 20 MMOL/L (ref 21–32)
CREAT SERPL-MCNC: <0.5 MG/DL (ref 0.6–1.1)
GFR SERPLBLD CREATININE-BSD FMLA CKD-EPI: >60 ML/MIN/{1.73_M2}
GLUCOSE SERPL-MCNC: 93 MG/DL (ref 70–99)
MAGNESIUM SERPL-MCNC: 1.9 MG/DL (ref 1.8–2.4)
POTASSIUM SERPL-SCNC: 3.5 MMOL/L (ref 3.5–5.1)
PROT SERPL-MCNC: 5.4 G/DL (ref 6.4–8.2)
SODIUM SERPL-SCNC: 143 MMOL/L (ref 136–145)

## 2023-08-31 PROCEDURE — 99231 SBSQ HOSP IP/OBS SF/LOW 25: CPT | Performed by: SURGERY

## 2023-08-31 PROCEDURE — 6360000002 HC RX W HCPCS: Performed by: NURSE PRACTITIONER

## 2023-08-31 PROCEDURE — 2580000003 HC RX 258: Performed by: NURSE PRACTITIONER

## 2023-08-31 PROCEDURE — 83735 ASSAY OF MAGNESIUM: CPT

## 2023-08-31 PROCEDURE — 2060000000 HC ICU INTERMEDIATE R&B

## 2023-08-31 PROCEDURE — 36415 COLL VENOUS BLD VENIPUNCTURE: CPT

## 2023-08-31 PROCEDURE — 6370000000 HC RX 637 (ALT 250 FOR IP): Performed by: NURSE PRACTITIONER

## 2023-08-31 PROCEDURE — C9113 INJ PANTOPRAZOLE SODIUM, VIA: HCPCS | Performed by: NURSE PRACTITIONER

## 2023-08-31 PROCEDURE — 80053 COMPREHEN METABOLIC PANEL: CPT

## 2023-08-31 RX ADMIN — CEFAZOLIN 2000 MG: 10 INJECTION, POWDER, FOR SOLUTION INTRAVENOUS at 09:11

## 2023-08-31 RX ADMIN — METRONIDAZOLE 500 MG: 500 INJECTION, SOLUTION INTRAVENOUS at 17:32

## 2023-08-31 RX ADMIN — OXYCODONE AND ACETAMINOPHEN 1 TABLET: 5; 325 TABLET ORAL at 21:56

## 2023-08-31 RX ADMIN — SODIUM CHLORIDE, PRESERVATIVE FREE 10 ML: 5 INJECTION INTRAVENOUS at 21:54

## 2023-08-31 RX ADMIN — ATORVASTATIN CALCIUM 20 MG: 10 TABLET, FILM COATED ORAL at 09:01

## 2023-08-31 RX ADMIN — METRONIDAZOLE 500 MG: 500 INJECTION, SOLUTION INTRAVENOUS at 00:11

## 2023-08-31 RX ADMIN — OXYCODONE AND ACETAMINOPHEN 1 TABLET: 5; 325 TABLET ORAL at 14:03

## 2023-08-31 RX ADMIN — CEFAZOLIN 2000 MG: 10 INJECTION, POWDER, FOR SOLUTION INTRAVENOUS at 00:14

## 2023-08-31 RX ADMIN — VENLAFAXINE HYDROCHLORIDE 150 MG: 150 CAPSULE, EXTENDED RELEASE ORAL at 21:53

## 2023-08-31 RX ADMIN — SODIUM CHLORIDE 25 ML: 9 INJECTION, SOLUTION INTRAVENOUS at 09:11

## 2023-08-31 RX ADMIN — PANTOPRAZOLE SODIUM 40 MG: 40 INJECTION, POWDER, FOR SOLUTION INTRAVENOUS at 09:01

## 2023-08-31 RX ADMIN — SODIUM CHLORIDE, PRESERVATIVE FREE 10 ML: 5 INJECTION INTRAVENOUS at 09:00

## 2023-08-31 RX ADMIN — CEFAZOLIN 2000 MG: 10 INJECTION, POWDER, FOR SOLUTION INTRAVENOUS at 16:42

## 2023-08-31 RX ADMIN — TOPIRAMATE 100 MG: 100 TABLET, FILM COATED ORAL at 21:53

## 2023-08-31 RX ADMIN — TOPIRAMATE 100 MG: 100 TABLET, FILM COATED ORAL at 09:01

## 2023-08-31 RX ADMIN — METRONIDAZOLE 500 MG: 500 INJECTION, SOLUTION INTRAVENOUS at 10:10

## 2023-08-31 ASSESSMENT — PAIN SCALES - GENERAL
PAINLEVEL_OUTOF10: 0
PAINLEVEL_OUTOF10: 5
PAINLEVEL_OUTOF10: 2
PAINLEVEL_OUTOF10: 5
PAINLEVEL_OUTOF10: 4
PAINLEVEL_OUTOF10: 2
PAINLEVEL_OUTOF10: 5

## 2023-08-31 ASSESSMENT — PAIN DESCRIPTION - ORIENTATION
ORIENTATION: RIGHT;LOWER
ORIENTATION: RIGHT;DISTAL
ORIENTATION: RIGHT;LOWER

## 2023-08-31 ASSESSMENT — PAIN DESCRIPTION - LOCATION
LOCATION: ABDOMEN

## 2023-08-31 ASSESSMENT — PAIN DESCRIPTION - DESCRIPTORS: DESCRIPTORS: CRAMPING

## 2023-08-31 ASSESSMENT — PAIN DESCRIPTION - PAIN TYPE: TYPE: ACUTE PAIN

## 2023-08-31 ASSESSMENT — PAIN DESCRIPTION - FREQUENCY: FREQUENCY: INTERMITTENT

## 2023-08-31 NOTE — PLAN OF CARE
Problem: Discharge Planning  Goal: Discharge to home or other facility with appropriate resources  Outcome: Progressing     Problem: Pain  Goal: Verbalizes/displays adequate comfort level or baseline comfort level  Outcome: Progressing     Problem: Cardiovascular - Adult  Goal: Maintains optimal cardiac output and hemodynamic stability  Outcome: Progressing     Problem: Skin/Tissue Integrity - Adult  Goal: Skin integrity remains intact  Outcome: Progressing     Problem: Musculoskeletal - Adult  Goal: Return mobility to safest level of function  Outcome: Progressing  Goal: Return ADL status to a safe level of function  Outcome: Progressing     Problem: Gastrointestinal - Adult  Goal: Minimal or absence of nausea and vomiting  Outcome: Progressing  Goal: Maintains or returns to baseline bowel function  Outcome: Progressing  Goal: Maintains adequate nutritional intake  Outcome: Progressing     Problem: Infection - Adult  Goal: Absence of infection at discharge  Outcome: Progressing  Goal: Absence of infection during hospitalization  Outcome: Progressing     Problem: Metabolic/Fluid and Electrolytes - Adult  Goal: Electrolytes maintained within normal limits  Outcome: Progressing  Goal: Hemodynamic stability and optimal renal function maintained  Outcome: Progressing     Problem: Hematologic - Adult  Goal: Maintains hematologic stability  Outcome: Progressing

## 2023-08-31 NOTE — PROGRESS NOTES
Hospital Medicine Progress Note      Date of Admission: 8/28/2023  Hospital Day: 4    Chief Admission Complaint:  Direct admit from [facility]); pelvic abscess        Subjective:   notes ongoing abd pain but much improved, resting in bed, no overnight events, tolerating current diet       Presenting Admission History:       uRkhsana Snyder is a/an 63 y.o. female with a significant past medical history of GERD, lupus, fibromyalgia, hyperlipidemia, with chronic recurrent pancreatitis last known episode about 3 weeks ago presents to [facility] as a direct admit from [facility]) after presenting there earlier today with complaint of right lower quadrant pain for the last 2 days. She notes the pain is severe, 8 out of 10, sharp stabbing pain worse with movement, associated with chills and body aches yesterday and today. She notes nausea without any vomiting, no diarrhea. She notes chronic constipation with last bowel movement yesterday that was soft, light brown without visualized blood. She denies any prior vaginal bleeding or discharge before onset of this RLQ pain. She had a follow-up appointment with her PCP today for inpatient stay at [facility] 2 weeks ago for pancreatitis, provider noted significant right lower quadrant pain and sent her back to [facility] emergency department for suspected appendicitis. Her evaluation included laboratory studies and CT of the abdomen pelvis. CT there did not show any acute hepatitis but did show a cystic structure in the pelvic inlet on the right anterior of the iliac vessels with some dependent free fluid; concerning for ovarian cysts remnants, measuring 4.7 cm. Pertinent laboratory studies includes cell count with a WBC of 18.3, hemoglobin 11.8, MCH 34. Urinalysis was negative for acute findings. INR is 1.1. Chemistry panel showed sodium 134, creatinine 0.9, blood sugar 116.   Patient was

## 2023-08-31 NOTE — CARE COORDINATION
CT - aspiration of abscess. IVABX's Ancef Q8, cx pending. Advance diet as nausea improves per general surgery. Unclear source per general surgery and may require a colonoscopy once acute infection resolves as an outpatient. IPTA. Following for needs/barriers pertaining to discharge requiring a 's assistance.

## 2023-09-01 PROCEDURE — C9113 INJ PANTOPRAZOLE SODIUM, VIA: HCPCS | Performed by: NURSE PRACTITIONER

## 2023-09-01 PROCEDURE — 2580000003 HC RX 258: Performed by: NURSE PRACTITIONER

## 2023-09-01 PROCEDURE — 6370000000 HC RX 637 (ALT 250 FOR IP): Performed by: NURSE PRACTITIONER

## 2023-09-01 PROCEDURE — 99231 SBSQ HOSP IP/OBS SF/LOW 25: CPT | Performed by: SURGERY

## 2023-09-01 PROCEDURE — 2060000000 HC ICU INTERMEDIATE R&B

## 2023-09-01 PROCEDURE — 6360000002 HC RX W HCPCS: Performed by: NURSE PRACTITIONER

## 2023-09-01 RX ADMIN — SODIUM CHLORIDE: 9 INJECTION, SOLUTION INTRAVENOUS at 16:43

## 2023-09-01 RX ADMIN — Medication 10 ML: at 23:54

## 2023-09-01 RX ADMIN — PANTOPRAZOLE SODIUM 40 MG: 40 INJECTION, POWDER, FOR SOLUTION INTRAVENOUS at 09:44

## 2023-09-01 RX ADMIN — CEFAZOLIN 2000 MG: 10 INJECTION, POWDER, FOR SOLUTION INTRAVENOUS at 16:45

## 2023-09-01 RX ADMIN — TOPIRAMATE 100 MG: 100 TABLET, FILM COATED ORAL at 09:44

## 2023-09-01 RX ADMIN — SODIUM CHLORIDE, PRESERVATIVE FREE 10 ML: 5 INJECTION INTRAVENOUS at 09:45

## 2023-09-01 RX ADMIN — ATORVASTATIN CALCIUM 20 MG: 10 TABLET, FILM COATED ORAL at 09:44

## 2023-09-01 RX ADMIN — TOPIRAMATE 100 MG: 100 TABLET, FILM COATED ORAL at 21:27

## 2023-09-01 RX ADMIN — CEFAZOLIN 2000 MG: 10 INJECTION, POWDER, FOR SOLUTION INTRAVENOUS at 09:48

## 2023-09-01 RX ADMIN — SODIUM CHLORIDE, PRESERVATIVE FREE 10 ML: 5 INJECTION INTRAVENOUS at 21:27

## 2023-09-01 RX ADMIN — VENLAFAXINE HYDROCHLORIDE 150 MG: 150 CAPSULE, EXTENDED RELEASE ORAL at 21:27

## 2023-09-01 RX ADMIN — CEFAZOLIN 2000 MG: 10 INJECTION, POWDER, FOR SOLUTION INTRAVENOUS at 01:05

## 2023-09-01 RX ADMIN — CEFAZOLIN 2000 MG: 10 INJECTION, POWDER, FOR SOLUTION INTRAVENOUS at 23:56

## 2023-09-01 ASSESSMENT — PAIN DESCRIPTION - PAIN TYPE: TYPE: ACUTE PAIN

## 2023-09-01 ASSESSMENT — PAIN DESCRIPTION - ORIENTATION: ORIENTATION: RIGHT;LOWER

## 2023-09-01 ASSESSMENT — PAIN SCALES - GENERAL: PAINLEVEL_OUTOF10: 4

## 2023-09-01 ASSESSMENT — PAIN DESCRIPTION - LOCATION: LOCATION: ABDOMEN

## 2023-09-01 ASSESSMENT — PAIN DESCRIPTION - DESCRIPTORS: DESCRIPTORS: CRAMPING;DISCOMFORT

## 2023-09-01 NOTE — PROGRESS NOTES
Hospital Medicine Progress Note      Date of Admission: 8/28/2023  Hospital Day: 5    Chief Admission Complaint:  Direct admit from [facility]); pelvic abscess        Subjective:   notes ongoing abd pain when examined, not quite tolerating diet, resting in bed, no overnight events,     Presenting Admission History:        Denis Berg is a/an 63 y.o. female with a significant past medical history of GERD, lupus, fibromyalgia, hyperlipidemia, with chronic recurrent pancreatitis last known episode about 3 weeks ago presents to [facility] as a direct admit from [facility]) after presenting there earlier today with complaint of right lower quadrant pain for the last 2 days. She notes the pain is severe, 8 out of 10, sharp stabbing pain worse with movement, associated with chills and body aches yesterday and today. She notes nausea without any vomiting, no diarrhea. She notes chronic constipation with last bowel movement yesterday that was soft, light brown without visualized blood. She denies any prior vaginal bleeding or discharge before onset of this RLQ pain. She had a follow-up appointment with her PCP today for inpatient stay at [facility] 2 weeks ago for pancreatitis, provider noted significant right lower quadrant pain and sent her back to [facility] emergency department for suspected appendicitis. Her evaluation included laboratory studies and CT of the abdomen pelvis. CT there did not show any acute hepatitis but did show a cystic structure in the pelvic inlet on the right anterior of the iliac vessels with some dependent free fluid; concerning for ovarian cysts remnants, measuring 4.7 cm. Pertinent laboratory studies includes cell count with a WBC of 18.3, hemoglobin 11.8, MCH 34. Urinalysis was negative for acute findings. INR is 1.1. Chemistry panel showed sodium 134, creatinine 0.9, blood sugar 116.   Patient was Shayla Muhammad MD

## 2023-09-01 NOTE — PLAN OF CARE
Problem: Pain  Goal: Verbalizes/displays adequate comfort level or baseline comfort level  Outcome: Progressing  Note: Pt with intermittent complaints of right lower abdominal pain this shift, has declined the need for any intervention. Problem: Skin/Tissue Integrity - Adult  Goal: Skin integrity remains intact  Outcome: Progressing  Note: Skin remains intact. Pt able to turn and reposition self independently. Problem: Musculoskeletal - Adult  Goal: Return mobility to safest level of function  Outcome: Progressing  Note: Pt A&O, up independent. Problem: Gastrointestinal - Adult  Goal: Minimal or absence of nausea and vomiting  Outcome: Progressing  Note: Pt with some nausea this a.m, no emesis. Pt has not needed prn anti emetic this shift.

## 2023-09-01 NOTE — CARE COORDINATION
Transitioning IVABX's to PO. Trial lunch today and if tolerates diet home on PO abx's. No needs for CM / IPTA.

## 2023-09-01 NOTE — PROGRESS NOTES
Report received from day shift RN. Patient resting comfortably in bed. No signs of discomfort or distress. Bed is in lowest position, wheels locked, 2/2 side rails up. Patent ambulates independently. Bedside table and call light within reach. White board updated. Will continue to monitor patient. Carri Villalobos RN    11:09 PM  Shift assessment complete. (See findings in flowsheet). Med pass complete. PRN meds given for pain. (See MAR). VSS. Carri Villalobos RN    4:37 AM  No new events overnight. Patient resting quietly in bed. Pain well controlled with PRN meds. No additional needs at this time.  Carri Villalobos RN

## 2023-09-02 ENCOUNTER — APPOINTMENT (OUTPATIENT)
Dept: CT IMAGING | Age: 58
End: 2023-09-02
Attending: INTERNAL MEDICINE
Payer: MEDICARE

## 2023-09-02 LAB
BACTERIA BLD CULT ORG #2: NORMAL
BACTERIA BLD CULT: NORMAL

## 2023-09-02 PROCEDURE — 2580000003 HC RX 258: Performed by: NURSE PRACTITIONER

## 2023-09-02 PROCEDURE — 6370000000 HC RX 637 (ALT 250 FOR IP): Performed by: NURSE PRACTITIONER

## 2023-09-02 PROCEDURE — 6360000002 HC RX W HCPCS: Performed by: NURSE PRACTITIONER

## 2023-09-02 PROCEDURE — C9113 INJ PANTOPRAZOLE SODIUM, VIA: HCPCS | Performed by: NURSE PRACTITIONER

## 2023-09-02 PROCEDURE — 99232 SBSQ HOSP IP/OBS MODERATE 35: CPT | Performed by: SURGERY

## 2023-09-02 PROCEDURE — 6360000004 HC RX CONTRAST MEDICATION: Performed by: SURGERY

## 2023-09-02 PROCEDURE — 2060000000 HC ICU INTERMEDIATE R&B

## 2023-09-02 PROCEDURE — 74177 CT ABD & PELVIS W/CONTRAST: CPT

## 2023-09-02 RX ADMIN — SODIUM CHLORIDE, PRESERVATIVE FREE 10 ML: 5 INJECTION INTRAVENOUS at 09:33

## 2023-09-02 RX ADMIN — ATORVASTATIN CALCIUM 20 MG: 10 TABLET, FILM COATED ORAL at 09:33

## 2023-09-02 RX ADMIN — PANTOPRAZOLE SODIUM 40 MG: 40 INJECTION, POWDER, FOR SOLUTION INTRAVENOUS at 09:33

## 2023-09-02 RX ADMIN — CEFAZOLIN 2000 MG: 10 INJECTION, POWDER, FOR SOLUTION INTRAVENOUS at 23:49

## 2023-09-02 RX ADMIN — CEFAZOLIN 2000 MG: 10 INJECTION, POWDER, FOR SOLUTION INTRAVENOUS at 09:35

## 2023-09-02 RX ADMIN — Medication 10 ML: at 23:50

## 2023-09-02 RX ADMIN — CEFAZOLIN 2000 MG: 10 INJECTION, POWDER, FOR SOLUTION INTRAVENOUS at 17:13

## 2023-09-02 RX ADMIN — VENLAFAXINE HYDROCHLORIDE 150 MG: 150 CAPSULE, EXTENDED RELEASE ORAL at 20:32

## 2023-09-02 RX ADMIN — OXYCODONE AND ACETAMINOPHEN 1 TABLET: 5; 325 TABLET ORAL at 05:28

## 2023-09-02 RX ADMIN — SODIUM CHLORIDE, PRESERVATIVE FREE 10 ML: 5 INJECTION INTRAVENOUS at 20:32

## 2023-09-02 RX ADMIN — TOPIRAMATE 100 MG: 100 TABLET, FILM COATED ORAL at 09:33

## 2023-09-02 RX ADMIN — Medication 10 ML: at 00:30

## 2023-09-02 RX ADMIN — OXYCODONE AND ACETAMINOPHEN 1 TABLET: 5; 325 TABLET ORAL at 18:57

## 2023-09-02 RX ADMIN — IOPAMIDOL 75 ML: 755 INJECTION, SOLUTION INTRAVENOUS at 14:48

## 2023-09-02 RX ADMIN — PROCHLORPERAZINE EDISYLATE 10 MG: 5 INJECTION INTRAMUSCULAR; INTRAVENOUS at 13:41

## 2023-09-02 RX ADMIN — TOPIRAMATE 100 MG: 100 TABLET, FILM COATED ORAL at 20:32

## 2023-09-02 ASSESSMENT — PAIN DESCRIPTION - ORIENTATION
ORIENTATION: RIGHT

## 2023-09-02 ASSESSMENT — PAIN DESCRIPTION - DESCRIPTORS
DESCRIPTORS: DISCOMFORT
DESCRIPTORS: ACHING
DESCRIPTORS: ACHING;SHARP

## 2023-09-02 ASSESSMENT — PAIN DESCRIPTION - LOCATION
LOCATION: ABDOMEN

## 2023-09-02 ASSESSMENT — PAIN DESCRIPTION - FREQUENCY: FREQUENCY: CONTINUOUS

## 2023-09-02 ASSESSMENT — PAIN SCALES - GENERAL
PAINLEVEL_OUTOF10: 0
PAINLEVEL_OUTOF10: 7
PAINLEVEL_OUTOF10: 6
PAINLEVEL_OUTOF10: 7
PAINLEVEL_OUTOF10: 4
PAINLEVEL_OUTOF10: 7

## 2023-09-02 ASSESSMENT — PAIN - FUNCTIONAL ASSESSMENT
PAIN_FUNCTIONAL_ASSESSMENT: PREVENTS OR INTERFERES SOME ACTIVE ACTIVITIES AND ADLS
PAIN_FUNCTIONAL_ASSESSMENT: PREVENTS OR INTERFERES SOME ACTIVE ACTIVITIES AND ADLS

## 2023-09-02 ASSESSMENT — PAIN DESCRIPTION - PAIN TYPE: TYPE: ACUTE PAIN

## 2023-09-02 ASSESSMENT — PAIN DESCRIPTION - ONSET: ONSET: ON-GOING

## 2023-09-02 NOTE — FLOWSHEET NOTE
09/01/23 2123   Assessment   Charting Type Shift assessment   Neurological   Neuro (WDL) WDL   Level of Consciousness 0   Orientation Level Oriented to person;Oriented to place;Oriented to time;Oriented to situation   Cognition Appropriate judgement; Appropriate safety awareness; Appropriate attention/concentration; Appropriate for developmental age   Speech Clear; Appropriate for developmental age   Jillian Coma Scale   Eye Opening 4   Best Verbal Response 5   Best Motor Response 6   [place] Coma Scale Score 15   HEENT (Head, Ears, Eyes, Nose, & Throat)   Right Eye Impaired vision   Left Eye Impaired vision   Respiratory   Respiratory (WDL) WDL   Respiratory Pattern Regular   Respiratory Depth Normal   Respiratory Quality/Effort Unlabored   Chest Assessment Chest expansion symmetrical   Breath Sounds   Right Upper Lobe Clear   Right Middle Lobe Clear   Right Lower Lobe Clear   Left Upper Lobe Clear   Left Lower Lobe Clear   Cardiac   Cardiac Regularity Regular   Heart Sounds S1, S2   Cardiac Rhythm Sinus rhythm   Rhythm Interpretation   Pulse 80   Cardiac Monitor   Cardiac/Telemetry Monitor On Portable telemetry pack applied   Alarm Audible Centralized cardiac monitoring   Alarms Set Centralized cardiac monitoring   Electrodes Replaced Yes   Telemetry Box Number [id number]   Care Plan - Cardiovascular Goals   Maintains optimal cardiac output and hemodynamic stability Monitor blood pressure and heart rate   Gastrointestinal   Abdomen Inspection Soft   RUQ Bowel Sounds Active   LUQ Bowel Sounds Active   RLQ Bowel Sounds Active   LLQ Bowel Sounds Active   Tenderness Soft;Nontender   Passing Flatus Y   Care Plan - Gastrointestinal Goals   Minimal or absence of nausea and vomiting Provide nonpharmacologic comfort measures as appropriate; Advance diet as tolerated, if ordered   Maintains or returns to baseline bowel function Assess bowel function;Encourage oral fluids to ensure adequate hydration   Genitourinary   Genitourinary

## 2023-09-02 NOTE — PLAN OF CARE
Problem: Discharge Planning  Goal: Discharge to home or other facility with appropriate resources  9/2/2023 1103 by Wilver Hernández RN  Outcome: Progressing  9/2/2023 0532 by Gail Juarez RN  Outcome: Progressing  9/1/2023 2123 by Gail Juarez RN  Outcome: Progressing     Problem: Pain  Goal: Verbalizes/displays adequate comfort level or baseline comfort level  9/2/2023 1103 by Wilver Hernández RN  Outcome: Progressing  9/2/2023 0532 by Gail Juarez RN  Outcome: Progressing  9/1/2023 2123 by Gail Juarez RN  Outcome: Progressing     Problem: Cardiovascular - Adult  Goal: Maintains optimal cardiac output and hemodynamic stability  9/2/2023 1103 by Wilver Hernández RN  Outcome: Progressing  9/2/2023 0532 by Gail Juarez RN  Outcome: Progressing  9/1/2023 2123 by Gail Juarez RN  Outcome: Progressing  Flowsheets (Taken 9/1/2023 2123)  Maintains optimal cardiac output and hemodynamic stability: Monitor blood pressure and heart rate     Problem: Skin/Tissue Integrity - Adult  Goal: Skin integrity remains intact  9/2/2023 1103 by Wilver Hernández RN  Outcome: Progressing  9/2/2023 0532 by Gail Juarez RN  Outcome: Progressing  9/1/2023 2123 by Gail Juarez RN  Outcome: Progressing  Flowsheets (Taken 9/1/2023 2123)  Skin Integrity Remains Intact: Monitor for areas of redness and/or skin breakdown     Problem: Musculoskeletal - Adult  Goal: Return mobility to safest level of function  9/2/2023 1103 by Wilver Hernández RN  Outcome: Progressing  9/2/2023 0532 by Gail Juarez RN  Outcome: Progressing  9/1/2023 2123 by Gail Juarez RN  Outcome: Progressing  Goal: Return ADL status to a safe level of function  9/2/2023 1103 by Wilver Hernández RN  Outcome: Progressing  9/2/2023 0532 by Gail Juarez RN  Outcome: Progressing  9/1/2023 2123 by Gail Juarez RN  Outcome: Progressing     Problem: Gastrointestinal - Adult  Goal: Minimal or absence of nausea and vomiting  9/2/2023 1103 by [place]ver [place], RN  Outcome: Progressing  9/2/2023 0532 by Siri Diaz RN  Outcome: Progressing  9/1/2023 2123 by Siri Diaz RN  Outcome: Progressing  Flowsheets (Taken 9/1/2023 2123)  Minimal or absence of nausea and vomiting:   Provide nonpharmacologic comfort measures as appropriate   Advance diet as tolerated, if ordered  Goal: Maintains or returns to baseline bowel function  9/2/2023 1103 by Valeria Panchal RN  Outcome: Progressing  9/2/2023 0532 by Siri Diaz RN  Outcome: Progressing  9/1/2023 2123 by Siri Diaz RN  Outcome: Progressing  [address] (Taken 9/1/2023 2123)  Maintains or returns to baseline bowel function:   Assess bowel function   Encourage oral fluids to ensure adequate hydration  Goal: Maintains adequate nutritional intake  9/2/2023 1103 by Valeria Panchal RN  Outcome: Progressing  9/2/2023 0532 by Siri Diaz RN  Outcome: Progressing  9/1/2023 2123 by Siri Diaz RN  Outcome: Progressing     Problem: Infection - Adult  Goal: Absence of infection at discharge  9/2/2023 1103 by Valeria Panchal RN  Outcome: Progressing  9/2/2023 0532 by Siri Diaz RN  Outcome: Progressing  9/1/2023 2123 by Siri Diaz RN  Outcome: Progressing  Goal: Absence of infection during hospitalization  9/2/2023 1103 by Valeria Panchal RN  Outcome: Progressing  9/2/2023 0532 by Siri Diaz RN  Outcome: Progressing  9/1/2023 2123 by Siri Diaz RN  Outcome: Progressing     Problem: Metabolic/Fluid and Electrolytes - Adult  Goal: Electrolytes maintained within normal limits  9/2/2023 1103 by Valeria Panchal RN  Outcome: Progressing  9/2/2023 0532 by Siri Diaz RN  Outcome: Progressing  9/1/2023 2123 by Siri Diaz RN  Outcome: Progressing  Goal: Hemodynamic stability and optimal renal function maintained  9/2/2023 1103 by Valeria Panchal RN  Outcome: Progressing  9/2/2023 0532 by Siri Diaz RN  Outcome: Progressing  9/1/2023 2123 by Siri Diaz RN  Outcome: Progressing     Problem: Hematologic

## 2023-09-02 NOTE — PROGRESS NOTES
Pt resting quietly in bed. No c/o voiced. No nausea/vomiting presented. Pt appears pleasant. Denies any needs at this time.  BONNIERN

## 2023-09-02 NOTE — FLOWSHEET NOTE
09/02/23 0931   Assessment   Charting Type Shift assessment   Neurological   Neuro (WDL) WDL   Level of Consciousness 0   Orientation Level Oriented to person;Oriented to place;Oriented to time;Oriented to situation   Cognition Appropriate judgement; Appropriate safety awareness; Appropriate attention/concentration; Appropriate for developmental age   Speech Clear; Appropriate for developmental age   Jillian Coma Scale   Eye Opening 4   Best Verbal Response 5   Best Motor Response 6   [place] Coma Scale Score 15   NIHSS Stroke Scale   NIHSS Stroke Scale Assessed No   HEENT (Head, Ears, Eyes, Nose, & Throat)   Right Eye Impaired vision   Left Eye Impaired vision   Respiratory   Respiratory (WDL) WDL   Respiratory Pattern Regular   Respiratory Depth Normal   Respiratory Quality/Effort Unlabored   Chest Assessment Chest expansion symmetrical   Breath Sounds   Right Upper Lobe Clear   Right Middle Lobe Clear   Right Lower Lobe Clear   Left Upper Lobe Clear   Left Lower Lobe Clear   Cardiac   Cardiac Regularity Regular   Heart Sounds S1, S2   Cardiac Rhythm Sinus rhythm   Cardiac Monitor   Alarm Audible Centralized cardiac monitoring   Telemetry Box Number [id number]   Gastrointestinal   Abdomen Inspection Soft   RUQ Bowel Sounds Active   LUQ Bowel Sounds Active   RLQ Bowel Sounds Active   LLQ Bowel Sounds Active   Tenderness Soft;Nontender   Passing Flatus Y   Genitourinary   Genitourinary (WDL) WDL   Suprapubic Tenderness No   Dysuria (Pain/Burning w/Urination) No   Peripheral Vascular   Peripheral Vascular (WDL) WDL   Edema None   RUE Neurovascular Assessment   Capillary Refill Less than/Equal to 3 seconds   Color Appropriate for Ethnicity   Temperature Warm   R Radial Pulse +3 (Strong)   LUE Neurovascular Assessment   Capillary Refill Less than/Equal to 3 seconds   Color Appropriate for Ethnicity   Temperature Warm   LUE Sensation  Full sensation   L Radial Pulse +3 (Strong)   RLE Neurovascular Assessment   Capillary Refill

## 2023-09-02 NOTE — PROGRESS NOTES
Pt unable to tolerate lunch. Reports large emesis episode after eating. PRN Compazine given IVP, per PRN order. Order noted for CT abdomen/pelvis with IV and oral contrast. Writer notified ordering physician that pt unable to tolerate PO contrast at this time. Per Dr Loras Carrel, ok to do CT with IV contrast only. Writer notified pt and CT tech of change in order.  Fay Cavanaugh, RN

## 2023-09-02 NOTE — PROGRESS NOTES
Hospital Medicine Progress Note      Date of Admission: 8/28/2023  Hospital Day: 6    Chief Admission Complaint:  Direct admit from [facility]); pelvic abscess        Subjective:   notes ongoing abd pain last night, aware of pending CT today       Presenting Admission History:       Adriane Dover is a/an 63 y.o. female with a significant past medical history of GERD, lupus, fibromyalgia, hyperlipidemia, with chronic recurrent pancreatitis last known episode about 3 weeks ago presents to [facility] as a direct admit from [facility]) after presenting there earlier today with complaint of right lower quadrant pain for the last 2 days. She notes the pain is severe, 8 out of 10, sharp stabbing pain worse with movement, associated with chills and body aches yesterday and today. She notes nausea without any vomiting, no diarrhea. She notes chronic constipation with last bowel movement yesterday that was soft, light brown without visualized blood. She denies any prior vaginal bleeding or discharge before onset of this RLQ pain. She had a follow-up appointment with her PCP today for inpatient stay at [facility] 2 weeks ago for pancreatitis, provider noted significant right lower quadrant pain and sent her back to [facility] emergency department for suspected appendicitis. Her evaluation included laboratory studies and CT of the abdomen pelvis. CT there did not show any acute hepatitis but did show a cystic structure in the pelvic inlet on the right anterior of the iliac vessels with some dependent free fluid; concerning for ovarian cysts remnants, measuring 4.7 cm. Pertinent laboratory studies includes cell count with a WBC of 18.3, hemoglobin 11.8, MCH 34. Urinalysis was negative for acute findings. INR is 1.1. Chemistry panel showed sodium 134, creatinine 0.9, blood sugar 116.   Patient was treated with ertapenem IV piggyback in the 3)  [] Collateral history obtained from:    [x] All available Consultant notes from yesterday/today were reviewed  [x] All current labs were reviewed and interpreted for clinical significance   [x] Appropriate follow-up labs were ordered    Medications:  Personally reviewed in detail in conjunction w/ labs as documented for evidence of drug toxicity. Infusion Medications    sodium chloride 5 mL/hr at 09/01/23 1643     Scheduled Medications    sodium chloride flush  5-40 mL IntraVENous 2 times per day    pantoprazole  40 mg IntraVENous Daily    ceFAZolin  2,000 mg IntraVENous Q8H    atorvastatin  20 mg Oral Daily    topiramate  100 mg Oral BID    venlafaxine  150 mg Oral Nightly     PRN Meds: oxyCODONE-acetaminophen, sodium chloride flush, sodium chloride, acetaminophen **OR** acetaminophen, fentanNYL, prochlorperazine, hydrOXYzine HCl     Labs:  Personally reviewed and interpreted for clinical significance. No results for input(s): WBC, HGB, HCT, PLT in the last 72 hours. Recent Labs     08/31/23  0453      K 3.5   *   CO2 20*   BUN 3*   CREATININE <0.5*   CALCIUM 8.6   MG 1.90     No results for input(s): PROBNP, TROPHS in the last 72 hours. No results for input(s): LABA1C in the last 72 hours. Recent Labs     08/31/23  0453   AST 21   ALT 8*   BILITOT <0.2   ALKPHOS 97     No results for input(s): INR, LACTA, TSH in the last 72 hours. Urine Cultures: No results found for: LABURIN  Blood Cultures:   Lab Results   Component Value Date/Time    BC No Growth after 4 days of incubation. 08/29/2023 12:04 AM     Lab Results   Component Value Date/Time    BLOODCULT2 No Growth after 4 days of incubation.  08/29/2023 12:05 AM     Organism:   Lab Results   Component Value Date/Time    ORG Staphylococcus aureus 08/29/2023 04:10 PM         Serina Uribe MD

## 2023-09-02 NOTE — PLAN OF CARE
Problem: Pain  Goal: Verbalizes/displays adequate comfort level or baseline comfort level  9/2/2023 0532 by Nixon Corona RN  Outcome: Progressing     Problem: Skin/Tissue Integrity - Adult  Goal: Skin integrity remains intact  9/2/2023 0532 by Nixon Corona RN  Outcome: Progressing     Problem: Musculoskeletal - Adult  Goal: Return mobility to safest level of function  9/2/2023 0532 by Nixon Corona RN  Outcome: Progressing     Problem: Gastrointestinal - Adult  Goal: Minimal or absence of nausea and vomiting  9/2/2023 0532 by Nixon Corona RN  Outcome: Progressing     Problem: Gastrointestinal - Adult  Goal: Maintains or returns to baseline bowel function  9/2/2023 0532 by Nixon Corona RN  Outcome: Progressing     Problem: Gastrointestinal - Adult  Goal: Maintains adequate nutritional intake  9/2/2023 0532 by Nixon Corona RN  Outcome: Progressing     Problem: Discharge Planning  Goal: Discharge to home or other facility with appropriate resources  9/2/2023 0532 by Nixon Corona RN  Outcome: Progressing

## 2023-09-03 LAB
ALBUMIN SERPL-MCNC: 3.2 G/DL (ref 3.4–5)
ALP SERPL-CCNC: 96 U/L (ref 40–129)
ALT SERPL-CCNC: <5 U/L (ref 10–40)
ANION GAP SERPL CALCULATED.3IONS-SCNC: 10 MMOL/L (ref 3–16)
AST SERPL-CCNC: 12 U/L (ref 15–37)
BACTERIA SPEC AEROBE CULT: ABNORMAL
BACTERIA SPEC ANAEROBE CULT: ABNORMAL
BASOPHILS # BLD: 0 K/UL (ref 0–0.2)
BASOPHILS NFR BLD: 0.5 %
BILIRUB DIRECT SERPL-MCNC: <0.2 MG/DL (ref 0–0.3)
BILIRUB INDIRECT SERPL-MCNC: ABNORMAL MG/DL (ref 0–1)
BILIRUB SERPL-MCNC: <0.2 MG/DL (ref 0–1)
BUN SERPL-MCNC: 5 MG/DL (ref 7–20)
CALCIUM SERPL-MCNC: 9.1 MG/DL (ref 8.3–10.6)
CHLORIDE SERPL-SCNC: 110 MMOL/L (ref 99–110)
CO2 SERPL-SCNC: 23 MMOL/L (ref 21–32)
CREAT SERPL-MCNC: <0.5 MG/DL (ref 0.6–1.1)
DEPRECATED RDW RBC AUTO: 13.7 % (ref 12.4–15.4)
EOSINOPHIL # BLD: 0.1 K/UL (ref 0–0.6)
EOSINOPHIL NFR BLD: 1.2 %
GFR SERPLBLD CREATININE-BSD FMLA CKD-EPI: >60 ML/MIN/{1.73_M2}
GLUCOSE SERPL-MCNC: 93 MG/DL (ref 70–99)
GRAM STN SPEC: ABNORMAL
HCT VFR BLD AUTO: 33.5 % (ref 36–48)
HGB BLD-MCNC: 11.5 G/DL (ref 12–16)
LIPASE SERPL-CCNC: 100 U/L (ref 13–60)
LYMPHOCYTES # BLD: 1.1 K/UL (ref 1–5.1)
LYMPHOCYTES NFR BLD: 14.7 %
MCH RBC QN AUTO: 35.1 PG (ref 26–34)
MCHC RBC AUTO-ENTMCNC: 34.4 G/DL (ref 31–36)
MCV RBC AUTO: 102.1 FL (ref 80–100)
MONOCYTES # BLD: 1 K/UL (ref 0–1.3)
MONOCYTES NFR BLD: 13.7 %
NEUTROPHILS # BLD: 5.3 K/UL (ref 1.7–7.7)
NEUTROPHILS NFR BLD: 69.9 %
ORGANISM: ABNORMAL
PHOSPHATE SERPL-MCNC: 3.4 MG/DL (ref 2.5–4.9)
PLATELET # BLD AUTO: 351 K/UL (ref 135–450)
PMV BLD AUTO: 8.1 FL (ref 5–10.5)
POTASSIUM SERPL-SCNC: 3.6 MMOL/L (ref 3.5–5.1)
PROT SERPL-MCNC: 6.2 G/DL (ref 6.4–8.2)
RBC # BLD AUTO: 3.28 M/UL (ref 4–5.2)
SODIUM SERPL-SCNC: 143 MMOL/L (ref 136–145)
WBC # BLD AUTO: 7.6 K/UL (ref 4–11)

## 2023-09-03 PROCEDURE — 2580000003 HC RX 258: Performed by: NURSE PRACTITIONER

## 2023-09-03 PROCEDURE — 6360000002 HC RX W HCPCS: Performed by: NURSE PRACTITIONER

## 2023-09-03 PROCEDURE — 2060000000 HC ICU INTERMEDIATE R&B

## 2023-09-03 PROCEDURE — 83690 ASSAY OF LIPASE: CPT

## 2023-09-03 PROCEDURE — 36415 COLL VENOUS BLD VENIPUNCTURE: CPT

## 2023-09-03 PROCEDURE — 80069 RENAL FUNCTION PANEL: CPT

## 2023-09-03 PROCEDURE — 6370000000 HC RX 637 (ALT 250 FOR IP): Performed by: NURSE PRACTITIONER

## 2023-09-03 PROCEDURE — 80076 HEPATIC FUNCTION PANEL: CPT

## 2023-09-03 PROCEDURE — 99232 SBSQ HOSP IP/OBS MODERATE 35: CPT | Performed by: SURGERY

## 2023-09-03 PROCEDURE — C9113 INJ PANTOPRAZOLE SODIUM, VIA: HCPCS | Performed by: NURSE PRACTITIONER

## 2023-09-03 PROCEDURE — 85025 COMPLETE CBC W/AUTO DIFF WBC: CPT

## 2023-09-03 RX ADMIN — OXYCODONE AND ACETAMINOPHEN 1 TABLET: 5; 325 TABLET ORAL at 00:02

## 2023-09-03 RX ADMIN — VENLAFAXINE HYDROCHLORIDE 150 MG: 150 CAPSULE, EXTENDED RELEASE ORAL at 22:28

## 2023-09-03 RX ADMIN — TOPIRAMATE 100 MG: 100 TABLET, FILM COATED ORAL at 08:22

## 2023-09-03 RX ADMIN — Medication 20 ML: at 00:20

## 2023-09-03 RX ADMIN — PANTOPRAZOLE SODIUM 40 MG: 40 INJECTION, POWDER, FOR SOLUTION INTRAVENOUS at 08:23

## 2023-09-03 RX ADMIN — CEFAZOLIN 2000 MG: 10 INJECTION, POWDER, FOR SOLUTION INTRAVENOUS at 16:14

## 2023-09-03 RX ADMIN — PROCHLORPERAZINE EDISYLATE 10 MG: 5 INJECTION INTRAMUSCULAR; INTRAVENOUS at 16:20

## 2023-09-03 RX ADMIN — OXYCODONE AND ACETAMINOPHEN 1 TABLET: 5; 325 TABLET ORAL at 11:52

## 2023-09-03 RX ADMIN — OXYCODONE AND ACETAMINOPHEN 1 TABLET: 5; 325 TABLET ORAL at 16:20

## 2023-09-03 RX ADMIN — OXYCODONE AND ACETAMINOPHEN 1 TABLET: 5; 325 TABLET ORAL at 22:28

## 2023-09-03 RX ADMIN — TOPIRAMATE 100 MG: 100 TABLET, FILM COATED ORAL at 22:28

## 2023-09-03 RX ADMIN — SODIUM CHLORIDE, PRESERVATIVE FREE 10 ML: 5 INJECTION INTRAVENOUS at 22:28

## 2023-09-03 RX ADMIN — SODIUM CHLORIDE, PRESERVATIVE FREE 10 ML: 5 INJECTION INTRAVENOUS at 08:23

## 2023-09-03 RX ADMIN — ATORVASTATIN CALCIUM 20 MG: 10 TABLET, FILM COATED ORAL at 08:22

## 2023-09-03 RX ADMIN — CEFAZOLIN 2000 MG: 10 INJECTION, POWDER, FOR SOLUTION INTRAVENOUS at 08:29

## 2023-09-03 ASSESSMENT — PAIN DESCRIPTION - ORIENTATION: ORIENTATION: RIGHT

## 2023-09-03 ASSESSMENT — PAIN DESCRIPTION - LOCATION
LOCATION: ABDOMEN

## 2023-09-03 ASSESSMENT — PAIN SCALES - GENERAL
PAINLEVEL_OUTOF10: 7
PAINLEVEL_OUTOF10: 7
PAINLEVEL_OUTOF10: 3
PAINLEVEL_OUTOF10: 7
PAINLEVEL_OUTOF10: 7
PAINLEVEL_OUTOF10: 3
PAINLEVEL_OUTOF10: 7

## 2023-09-03 ASSESSMENT — PAIN DESCRIPTION - DESCRIPTORS: DESCRIPTORS: ACHING;SHARP

## 2023-09-03 ASSESSMENT — PAIN - FUNCTIONAL ASSESSMENT: PAIN_FUNCTIONAL_ASSESSMENT: PREVENTS OR INTERFERES SOME ACTIVE ACTIVITIES AND ADLS

## 2023-09-03 NOTE — PLAN OF CARE
Problem: Discharge Planning  Goal: Discharge to home or other facility with appropriate resources  9/3/2023 1213 by Marbin Mari RN  Outcome: Progressing  9/3/2023 0527 by Clarisa Riley RN  Outcome: Progressing  Flowsheets (Taken 9/2/2023 2034)  Discharge to home or other facility with appropriate resources: Identify barriers to discharge with patient and caregiver     Problem: Pain  Goal: Verbalizes/displays adequate comfort level or baseline comfort level  9/3/2023 1213 by Marbin Mari RN  Outcome: Progressing  9/3/2023 0527 by Clarisa Riley RN  Outcome: Progressing  Flowsheets (Taken 9/2/2023 2353)  Verbalizes/displays adequate comfort level or baseline comfort level:   Encourage patient to monitor pain and request assistance   Assess pain using appropriate pain scale   Administer analgesics based on type and severity of pain and evaluate response   Implement non-pharmacological measures as appropriate and evaluate response     Problem: Cardiovascular - Adult  Goal: Maintains optimal cardiac output and hemodynamic stability  9/3/2023 1213 by Marbin Mari RN  Outcome: Progressing  9/3/2023 0527 by Clarisa Riley RN  Outcome: Progressing     Problem: Skin/Tissue Integrity - Adult  Goal: Skin integrity remains intact  9/3/2023 1213 by Marbin Mari RN  Outcome: Progressing  9/3/2023 0527 by Clarisa Riley RN  Outcome: Progressing  Flowsheets (Taken 9/2/2023 2034)  Skin Integrity Remains Intact: Monitor for areas of redness and/or skin breakdown     Problem: Musculoskeletal - Adult  Goal: Return mobility to safest level of function  9/3/2023 1213 by Marbin Mari RN  Outcome: Progressing  9/3/2023 0527 by Clarisa Riley RN  Outcome: Progressing  Flowsheets (Taken 9/2/2023 2034)  Return Mobility to Safest Level of Function: Assess patient stability and activity tolerance for standing, transferring and ambulating with or without assistive devices  Goal: Return ADL status to a safe level of

## 2023-09-03 NOTE — PLAN OF CARE
Problem: Pain  Goal: Verbalizes/displays adequate comfort level or baseline comfort level  9/3/2023 0527 by Maki Jordan RN  Outcome: Progressing  Flowsheets (Taken 9/2/2023 2353)  Verbalizes/displays adequate comfort level or baseline comfort level:   Encourage patient to monitor pain and request assistance   Assess pain using appropriate pain scale   Administer analgesics based on type and severity of pain and evaluate response   Implement non-pharmacological measures as appropriate and evaluate response     Problem: Skin/Tissue Integrity - Adult  Goal: Skin integrity remains intact  9/3/2023 0527 by Maki Jordan RN  Outcome: Progressing  Flowsheets (Taken 9/2/2023 2034)  Skin Integrity Remains Intact: Monitor for areas of redness and/or skin breakdown     Problem: Gastrointestinal - Adult  Goal: Minimal or absence of nausea and vomiting  9/3/2023 0527 by Maki Jordan RN  Outcome: Progressing     Problem: Gastrointestinal - Adult  Goal: Maintains or returns to baseline bowel function  9/3/2023 0527 by Maki Jordan RN  Outcome: Progressing     Problem: Gastrointestinal - Adult  Goal: Maintains adequate nutritional intake  9/3/2023 0527 by Maki Jordan RN  Outcome: Progressing     Problem: Metabolic/Fluid and Electrolytes - Adult  Goal: Hemodynamic stability and optimal renal function maintained  9/3/2023 0527 by Maki Jordan RN  Outcome: Progressing

## 2023-09-03 NOTE — FLOWSHEET NOTE
09/03/23 0740   Assessment   Charting Type Shift assessment   Neurological   Neuro (WDL) WDL   Level of Consciousness 0   Orientation Level Oriented to person;Oriented to place;Oriented to time;Oriented to situation   Cognition Appropriate judgement; Appropriate safety awareness; Appropriate attention/concentration; Appropriate for developmental age   Speech Clear; Appropriate for developmental age   Jillian Coma Scale   Eye Opening 4   Best Verbal Response 5   Best Motor Response 6   [place] Coma Scale Score 15   NIHSS Stroke Scale   NIHSS Stroke Scale Assessed No   HEENT (Head, Ears, Eyes, Nose, & Throat)   Right Eye Impaired vision   Left Eye Impaired vision   Respiratory   Respiratory (WDL) WDL   Respiratory Pattern Regular   Respiratory Depth Normal   Respiratory Quality/Effort Unlabored   Chest Assessment Chest expansion symmetrical   Breath Sounds   Right Upper Lobe Clear   Right Middle Lobe Clear   Right Lower Lobe Clear   Left Upper Lobe Clear   Left Lower Lobe Clear   Cardiac   Cardiac Regularity Regular   Heart Sounds S1, S2   Cardiac Rhythm Sinus rhythm   Rhythm Interpretation   Pulse 91   Cardiac Monitor   Alarm Audible Centralized cardiac monitoring   Telemetry Box Number 56   Gastrointestinal   Abdomen Inspection Soft   RUQ Bowel Sounds Active   LUQ Bowel Sounds Active   RLQ Bowel Sounds Active   LLQ Bowel Sounds Active   Tenderness Soft;Nontender   Passing Flatus Y   Genitourinary   Genitourinary (WDL) WDL   Suprapubic Tenderness No   Dysuria (Pain/Burning w/Urination) No   Peripheral Vascular   Peripheral Vascular (WDL) WDL   Edema None   RUE Neurovascular Assessment   Capillary Refill Less than/Equal to 3 seconds   Color Appropriate for Ethnicity   Temperature Warm   R Radial Pulse +3 (Strong)   LUE Neurovascular Assessment   Capillary Refill Less than/Equal to 3 seconds   Color Appropriate for Ethnicity   Temperature Warm   LUE Sensation  Full sensation   L Radial Pulse +3 (Strong)   RLE

## 2023-09-03 NOTE — PROGRESS NOTES
Report received from day shift RN. Patient resting comfortably in bed with eyes close. No signs of discomfort or distress. Bed is in lowest position, wheels locked, 2/2 side rails up. Bedside table and call light within reach. White board updated. Will continue to monitor patient. Billy Balderas RN    11:26 PM  Shift assessment complete. (See findings in flowsheet). Med pass complete. PRN percocet given for pain. (See MAR). VSS. Patient with no complaints at this time. Billy Balderas RN    4:39 AM  No new events overnight. Patient resting quietly through the night. VSS. No needs at this time.  Billy Balderas RN

## 2023-09-03 NOTE — PROGRESS NOTES
Hospital Medicine Progress Note      Date of Admission: 8/28/2023  Hospital Day: 7    Chief Admission Complaint:  Direct admit from [facility]); pelvic abscess        Subjective:   notes ongoing abd pain last night, Dr. Declan Oconnell at bedside        Presenting Admission History:        Wayne Aceves is a/an 63 y.o. female with a significant past medical history of GERD, lupus, fibromyalgia, hyperlipidemia, with chronic recurrent pancreatitis last known episode about 3 weeks ago presents to [facility] [place] a direct admit from [facility]) after presenting there earlier today with complaint of right lower quadrant pain for the last 2 days. She notes the pain is severe, 8 out of 10, sharp stabbing pain worse with movement, associated with chills and body aches yesterday and today. She notes nausea without any vomiting, no diarrhea. She notes chronic constipation with last bowel movement yesterday that was soft, light brown without visualized blood. She denies any prior vaginal bleeding or discharge before onset of this RLQ pain. She had a follow-up appointment with her PCP today for inpatient stay at [facility] 2 weeks ago for pancreatitis, provider noted significant right lower quadrant pain and sent her back to [facility] emergency department for suspected appendicitis. Her evaluation included laboratory studies and CT of the abdomen pelvis. CT there did not show any acute hepatitis but did show a cystic structure in the pelvic inlet on the right anterior of the iliac vessels with some dependent free fluid; concerning for ovarian cysts remnants, measuring 4.7 cm. Pertinent laboratory studies includes cell count with a WBC of 18.3, hemoglobin 11.8, MCH 34. Urinalysis was negative for acute findings. INR is 1.1. Chemistry panel showed sodium 134, creatinine 0.9, blood sugar 116.   Patient was treated with ertapenem IV piggyback in the Collateral history obtained from:    [x] All available Consultant notes from yesterday/today were reviewed  [x] All current labs were reviewed and interpreted for clinical significance   [x] Appropriate follow-up labs were ordered    Medications:  Personally reviewed in detail in conjunction w/ labs as documented for evidence of drug toxicity. Infusion Medications    sodium chloride 5 mL/hr at 09/01/23 1643     Scheduled Medications    sodium chloride flush  5-40 mL IntraVENous 2 times per day    pantoprazole  40 mg IntraVENous Daily    ceFAZolin  2,000 mg IntraVENous Q8H    atorvastatin  20 mg Oral Daily    topiramate  100 mg Oral BID    venlafaxine  150 mg Oral Nightly     PRN Meds: oxyCODONE-acetaminophen, sodium chloride flush, sodium chloride, acetaminophen **OR** acetaminophen, fentanNYL, prochlorperazine, hydrOXYzine HCl     Labs:  Personally reviewed and interpreted for clinical significance. Recent Labs     09/03/23 0418   WBC 7.6   HGB 11.5*   HCT 33.5*        Recent Labs     09/03/23 0418      K 3.6      CO2 23   BUN 5*   CREATININE <0.5*   CALCIUM 9.1   PHOS 3.4     No results for input(s): PROBNP, TROPHS in the last 72 hours. No results for input(s): LABA1C in the last 72 hours. Recent Labs     09/03/23 0418   AST 12*   ALT <5*   BILIDIR <0.2   BILITOT <0.2   ALKPHOS 96     No results for input(s): INR, LACTA, TSH in the last 72 hours. Urine Cultures: No results found for: LABURIN  Blood Cultures:   Lab Results   Component Value Date/Time    BC No Growth after 4 days of incubation. 08/29/2023 12:04 AM     Lab Results   Component Value Date/Time    BLOODCULT2 No Growth after 4 days of incubation.  08/29/2023 12:05 AM     Organism:   Lab Results   Component Value Date/Time    ORG Staphylococcus aureus 08/29/2023 04:10 PM         Norma Hilliard MD

## 2023-09-04 LAB
ALBUMIN SERPL-MCNC: 3 G/DL (ref 3.4–5)
ANION GAP SERPL CALCULATED.3IONS-SCNC: 11 MMOL/L (ref 3–16)
BASOPHILS # BLD: 0 K/UL (ref 0–0.2)
BASOPHILS NFR BLD: 0.7 %
BUN SERPL-MCNC: 5 MG/DL (ref 7–20)
CALCIUM SERPL-MCNC: 8.8 MG/DL (ref 8.3–10.6)
CHLORIDE SERPL-SCNC: 111 MMOL/L (ref 99–110)
CO2 SERPL-SCNC: 20 MMOL/L (ref 21–32)
CREAT SERPL-MCNC: <0.5 MG/DL (ref 0.6–1.1)
DEPRECATED RDW RBC AUTO: 14.2 % (ref 12.4–15.4)
EOSINOPHIL # BLD: 0.1 K/UL (ref 0–0.6)
EOSINOPHIL NFR BLD: 1.5 %
GFR SERPLBLD CREATININE-BSD FMLA CKD-EPI: >60 ML/MIN/{1.73_M2}
GLUCOSE SERPL-MCNC: 101 MG/DL (ref 70–99)
HCT VFR BLD AUTO: 33.8 % (ref 36–48)
HGB BLD-MCNC: 11.7 G/DL (ref 12–16)
LIPASE SERPL-CCNC: 97 U/L (ref 13–60)
LYMPHOCYTES # BLD: 1 K/UL (ref 1–5.1)
LYMPHOCYTES NFR BLD: 14.9 %
MCH RBC QN AUTO: 35.3 PG (ref 26–34)
MCHC RBC AUTO-ENTMCNC: 34.5 G/DL (ref 31–36)
MCV RBC AUTO: 102.3 FL (ref 80–100)
MONOCYTES # BLD: 0.9 K/UL (ref 0–1.3)
MONOCYTES NFR BLD: 12.5 %
NEUTROPHILS # BLD: 4.9 K/UL (ref 1.7–7.7)
NEUTROPHILS NFR BLD: 70.4 %
PHOSPHATE SERPL-MCNC: 3.5 MG/DL (ref 2.5–4.9)
PLATELET # BLD AUTO: 353 K/UL (ref 135–450)
PMV BLD AUTO: 7.7 FL (ref 5–10.5)
POTASSIUM SERPL-SCNC: 3.3 MMOL/L (ref 3.5–5.1)
RBC # BLD AUTO: 3.31 M/UL (ref 4–5.2)
SODIUM SERPL-SCNC: 142 MMOL/L (ref 136–145)
WBC # BLD AUTO: 6.9 K/UL (ref 4–11)

## 2023-09-04 PROCEDURE — 99232 SBSQ HOSP IP/OBS MODERATE 35: CPT | Performed by: SURGERY

## 2023-09-04 PROCEDURE — 6360000002 HC RX W HCPCS: Performed by: NURSE PRACTITIONER

## 2023-09-04 PROCEDURE — 2580000003 HC RX 258: Performed by: NURSE PRACTITIONER

## 2023-09-04 PROCEDURE — 6370000000 HC RX 637 (ALT 250 FOR IP): Performed by: NURSE PRACTITIONER

## 2023-09-04 PROCEDURE — 80069 RENAL FUNCTION PANEL: CPT

## 2023-09-04 PROCEDURE — 83690 ASSAY OF LIPASE: CPT

## 2023-09-04 PROCEDURE — 2060000000 HC ICU INTERMEDIATE R&B

## 2023-09-04 PROCEDURE — 85025 COMPLETE CBC W/AUTO DIFF WBC: CPT

## 2023-09-04 PROCEDURE — 6370000000 HC RX 637 (ALT 250 FOR IP): Performed by: REGISTERED NURSE

## 2023-09-04 PROCEDURE — 36415 COLL VENOUS BLD VENIPUNCTURE: CPT

## 2023-09-04 PROCEDURE — C9113 INJ PANTOPRAZOLE SODIUM, VIA: HCPCS | Performed by: NURSE PRACTITIONER

## 2023-09-04 RX ORDER — POLYETHYLENE GLYCOL 3350 17 G/17G
17 POWDER, FOR SOLUTION ORAL DAILY PRN
Status: DISCONTINUED | OUTPATIENT
Start: 2023-09-04 | End: 2023-09-08 | Stop reason: HOSPADM

## 2023-09-04 RX ADMIN — CEFAZOLIN 2000 MG: 10 INJECTION, POWDER, FOR SOLUTION INTRAVENOUS at 01:41

## 2023-09-04 RX ADMIN — POLYETHYLENE GLYCOL 3350 17 G: 17 POWDER, FOR SOLUTION ORAL at 21:45

## 2023-09-04 RX ADMIN — OXYCODONE AND ACETAMINOPHEN 1 TABLET: 5; 325 TABLET ORAL at 21:39

## 2023-09-04 RX ADMIN — CEFAZOLIN 2000 MG: 10 INJECTION, POWDER, FOR SOLUTION INTRAVENOUS at 08:50

## 2023-09-04 RX ADMIN — PANTOPRAZOLE SODIUM 40 MG: 40 INJECTION, POWDER, FOR SOLUTION INTRAVENOUS at 08:46

## 2023-09-04 RX ADMIN — CEFAZOLIN 2000 MG: 10 INJECTION, POWDER, FOR SOLUTION INTRAVENOUS at 17:57

## 2023-09-04 RX ADMIN — ATORVASTATIN CALCIUM 20 MG: 10 TABLET, FILM COATED ORAL at 08:30

## 2023-09-04 RX ADMIN — SODIUM CHLORIDE: 9 INJECTION, SOLUTION INTRAVENOUS at 17:57

## 2023-09-04 RX ADMIN — PROCHLORPERAZINE EDISYLATE 10 MG: 5 INJECTION INTRAMUSCULAR; INTRAVENOUS at 17:51

## 2023-09-04 RX ADMIN — TOPIRAMATE 100 MG: 100 TABLET, FILM COATED ORAL at 21:38

## 2023-09-04 RX ADMIN — PROCHLORPERAZINE EDISYLATE 10 MG: 5 INJECTION INTRAMUSCULAR; INTRAVENOUS at 11:48

## 2023-09-04 RX ADMIN — OXYCODONE AND ACETAMINOPHEN 1 TABLET: 5; 325 TABLET ORAL at 08:29

## 2023-09-04 RX ADMIN — TOPIRAMATE 100 MG: 100 TABLET, FILM COATED ORAL at 08:30

## 2023-09-04 RX ADMIN — VENLAFAXINE HYDROCHLORIDE 150 MG: 150 CAPSULE, EXTENDED RELEASE ORAL at 21:39

## 2023-09-04 RX ADMIN — SODIUM CHLORIDE, PRESERVATIVE FREE 10 ML: 5 INJECTION INTRAVENOUS at 11:43

## 2023-09-04 RX ADMIN — SODIUM CHLORIDE, PRESERVATIVE FREE 10 ML: 5 INJECTION INTRAVENOUS at 21:39

## 2023-09-04 ASSESSMENT — PAIN SCALES - GENERAL
PAINLEVEL_OUTOF10: 3
PAINLEVEL_OUTOF10: 4
PAINLEVEL_OUTOF10: 8
PAINLEVEL_OUTOF10: 7
PAINLEVEL_OUTOF10: 2
PAINLEVEL_OUTOF10: 2

## 2023-09-04 ASSESSMENT — PAIN DESCRIPTION - LOCATION
LOCATION: ABDOMEN
LOCATION: ABDOMEN

## 2023-09-04 ASSESSMENT — PAIN DESCRIPTION - ORIENTATION
ORIENTATION: RIGHT;LOWER
ORIENTATION: RIGHT;LOWER

## 2023-09-04 ASSESSMENT — PAIN DESCRIPTION - DESCRIPTORS
DESCRIPTORS: DULL;SHARP
DESCRIPTORS: DULL

## 2023-09-04 ASSESSMENT — PAIN - FUNCTIONAL ASSESSMENT: PAIN_FUNCTIONAL_ASSESSMENT: ACTIVITIES ARE NOT PREVENTED

## 2023-09-04 NOTE — PLAN OF CARE
Problem: Pain  Goal: Verbalizes/displays adequate comfort level or baseline comfort level  Outcome: Progressing  Note: Pt with complaints of right lower quadrant abdominal pain, medicated with prn percocet with effective results. Problem: Gastrointestinal - Adult  Goal: Minimal or absence of nausea and vomiting  Outcome: Progressing  Note: Pt with some nausea this shift, medicated with prn compazine with effective results.

## 2023-09-04 NOTE — PLAN OF CARE
Problem: Discharge Planning  Goal: Discharge to home or other facility with appropriate resources  Outcome: Progressing     Problem: Pain  Goal: Verbalizes/displays adequate comfort level or baseline comfort level  Outcome: Progressing     Problem: Cardiovascular - Adult  Goal: Maintains optimal cardiac output and hemodynamic stability  Outcome: Progressing  Flowsheets (Taken 9/4/2023 0419)  Maintains optimal cardiac output and hemodynamic stability:   Monitor blood pressure and heart rate   Monitor urine output and notify Licensed Independent Practitioner for values outside of normal range   Assess for signs of decreased cardiac output   Administer fluid and/or volume expanders as ordered     Problem: Skin/Tissue Integrity - Adult  Goal: Skin integrity remains intact  Outcome: Progressing  Flowsheets (Taken 9/4/2023 0419)  Skin Integrity Remains Intact:   Monitor for areas of redness and/or skin breakdown   Assess vascular access sites hourly     Problem: Musculoskeletal - Adult  Goal: Return mobility to safest level of function  Outcome: Progressing  Flowsheets (Taken 9/4/2023 0419)  Return Mobility to Safest Level of Function:   Assess patient stability and activity tolerance for standing, transferring and ambulating with or without assistive devices   Assist with transfers and ambulation using safe patient handling equipment as needed   Ensure adequate protection for wounds/incisions during mobilization  Goal: Return ADL status to a safe level of function  Outcome: Progressing     Problem: Musculoskeletal - Adult  Goal: Return ADL status to a safe level of function  Outcome: Progressing     Problem: Gastrointestinal - Adult  Goal: Minimal or absence of nausea and vomiting  Outcome: Progressing  Goal: Maintains adequate nutritional intake  Outcome: Progressing     Problem: Infection - Adult  Goal: Absence of infection at discharge  Outcome: Progressing

## 2023-09-04 NOTE — PROGRESS NOTES
Report received from day shift RN. Patient resting comfortably in bed. No signs of discomfort or distress. Bed is in lowest position, wheels locked, 2/2 side rails up. Bedside table and call light within reach. White board updated. Will continue to monitor patient. Veronika Mullins RN    8:12 PM  Message sent to provider \"Patient admitted for \"intrabdominal abcess\". Surg has been following, repeat CT tomorrow to reassess accumulation. She has not had a BM in 3 days. She is asking for Miralax for dulcolax. If appropritate, please place orders. Thanks. \" Veronika Mullins RN    10:03 PM  Shift assessment complete. (See findings in flowsheet). Med pass complete. (See MAR). VSS. PRN percocet given for pain. Veronika Mullins RN    5:13 AM  PRN compazine and percocet given.  Veronika Mullins RN

## 2023-09-05 ENCOUNTER — APPOINTMENT (OUTPATIENT)
Dept: CT IMAGING | Age: 58
End: 2023-09-05
Attending: INTERNAL MEDICINE
Payer: MEDICARE

## 2023-09-05 LAB
ALBUMIN SERPL-MCNC: 2.8 G/DL (ref 3.4–5)
ANION GAP SERPL CALCULATED.3IONS-SCNC: 11 MMOL/L (ref 3–16)
BASOPHILS # BLD: 0 K/UL (ref 0–0.2)
BASOPHILS NFR BLD: 0.4 %
BUN SERPL-MCNC: 5 MG/DL (ref 7–20)
CALCIUM SERPL-MCNC: 9 MG/DL (ref 8.3–10.6)
CHLORIDE SERPL-SCNC: 112 MMOL/L (ref 99–110)
CO2 SERPL-SCNC: 19 MMOL/L (ref 21–32)
CREAT SERPL-MCNC: <0.5 MG/DL (ref 0.6–1.1)
DEPRECATED RDW RBC AUTO: 13.8 % (ref 12.4–15.4)
EOSINOPHIL # BLD: 0.1 K/UL (ref 0–0.6)
EOSINOPHIL NFR BLD: 1.6 %
GFR SERPLBLD CREATININE-BSD FMLA CKD-EPI: >60 ML/MIN/{1.73_M2}
GLUCOSE SERPL-MCNC: 96 MG/DL (ref 70–99)
HCT VFR BLD AUTO: 33.1 % (ref 36–48)
HGB BLD-MCNC: 11.3 G/DL (ref 12–16)
INR PPP: 1.14 (ref 0.84–1.16)
LIPASE SERPL-CCNC: 85 U/L (ref 13–60)
LYMPHOCYTES # BLD: 1 K/UL (ref 1–5.1)
LYMPHOCYTES NFR BLD: 15.3 %
MCH RBC QN AUTO: 34.8 PG (ref 26–34)
MCHC RBC AUTO-ENTMCNC: 34.1 G/DL (ref 31–36)
MCV RBC AUTO: 102 FL (ref 80–100)
MONOCYTES # BLD: 0.7 K/UL (ref 0–1.3)
MONOCYTES NFR BLD: 10.3 %
NEUTROPHILS # BLD: 4.7 K/UL (ref 1.7–7.7)
NEUTROPHILS NFR BLD: 72.4 %
PHOSPHATE SERPL-MCNC: 3.3 MG/DL (ref 2.5–4.9)
PLATELET # BLD AUTO: 354 K/UL (ref 135–450)
PMV BLD AUTO: 8 FL (ref 5–10.5)
POTASSIUM SERPL-SCNC: 4 MMOL/L (ref 3.5–5.1)
PROTHROMBIN TIME: 14.6 SEC (ref 11.5–14.8)
RBC # BLD AUTO: 3.24 M/UL (ref 4–5.2)
SODIUM SERPL-SCNC: 142 MMOL/L (ref 136–145)
WBC # BLD AUTO: 6.4 K/UL (ref 4–11)

## 2023-09-05 PROCEDURE — 83690 ASSAY OF LIPASE: CPT

## 2023-09-05 PROCEDURE — 6370000000 HC RX 637 (ALT 250 FOR IP): Performed by: NURSE PRACTITIONER

## 2023-09-05 PROCEDURE — 80069 RENAL FUNCTION PANEL: CPT

## 2023-09-05 PROCEDURE — 6360000002 HC RX W HCPCS: Performed by: NURSE PRACTITIONER

## 2023-09-05 PROCEDURE — 6360000004 HC RX CONTRAST MEDICATION: Performed by: INTERNAL MEDICINE

## 2023-09-05 PROCEDURE — APPSS30 APP SPLIT SHARED TIME 16-30 MINUTES: Performed by: CLINICAL NURSE SPECIALIST

## 2023-09-05 PROCEDURE — 74177 CT ABD & PELVIS W/CONTRAST: CPT

## 2023-09-05 PROCEDURE — 36415 COLL VENOUS BLD VENIPUNCTURE: CPT

## 2023-09-05 PROCEDURE — 2580000003 HC RX 258: Performed by: NURSE PRACTITIONER

## 2023-09-05 PROCEDURE — 6360000004 HC RX CONTRAST MEDICATION: Performed by: SURGERY

## 2023-09-05 PROCEDURE — 85610 PROTHROMBIN TIME: CPT

## 2023-09-05 PROCEDURE — C9113 INJ PANTOPRAZOLE SODIUM, VIA: HCPCS | Performed by: NURSE PRACTITIONER

## 2023-09-05 PROCEDURE — 85025 COMPLETE CBC W/AUTO DIFF WBC: CPT

## 2023-09-05 PROCEDURE — 99232 SBSQ HOSP IP/OBS MODERATE 35: CPT | Performed by: SURGERY

## 2023-09-05 PROCEDURE — 2060000000 HC ICU INTERMEDIATE R&B

## 2023-09-05 RX ADMIN — SODIUM CHLORIDE, PRESERVATIVE FREE 10 ML: 5 INJECTION INTRAVENOUS at 09:00

## 2023-09-05 RX ADMIN — CEFAZOLIN 2000 MG: 10 INJECTION, POWDER, FOR SOLUTION INTRAVENOUS at 20:22

## 2023-09-05 RX ADMIN — VENLAFAXINE HYDROCHLORIDE 150 MG: 150 CAPSULE, EXTENDED RELEASE ORAL at 20:15

## 2023-09-05 RX ADMIN — CEFAZOLIN 2000 MG: 10 INJECTION, POWDER, FOR SOLUTION INTRAVENOUS at 00:49

## 2023-09-05 RX ADMIN — OXYCODONE AND ACETAMINOPHEN 1 TABLET: 5; 325 TABLET ORAL at 05:11

## 2023-09-05 RX ADMIN — IOPAMIDOL 75 ML: 755 INJECTION, SOLUTION INTRAVENOUS at 15:58

## 2023-09-05 RX ADMIN — DIATRIZOATE MEGLUMINE AND DIATRIZOATE SODIUM 30 ML: 660; 100 LIQUID ORAL; RECTAL at 11:46

## 2023-09-05 RX ADMIN — OXYCODONE AND ACETAMINOPHEN 1 TABLET: 5; 325 TABLET ORAL at 11:46

## 2023-09-05 RX ADMIN — CEFAZOLIN 2000 MG: 10 INJECTION, POWDER, FOR SOLUTION INTRAVENOUS at 12:05

## 2023-09-05 RX ADMIN — OXYCODONE AND ACETAMINOPHEN 1 TABLET: 5; 325 TABLET ORAL at 20:15

## 2023-09-05 RX ADMIN — PROCHLORPERAZINE EDISYLATE 10 MG: 5 INJECTION INTRAMUSCULAR; INTRAVENOUS at 23:00

## 2023-09-05 RX ADMIN — ATORVASTATIN CALCIUM 20 MG: 10 TABLET, FILM COATED ORAL at 09:00

## 2023-09-05 RX ADMIN — PANTOPRAZOLE SODIUM 40 MG: 40 INJECTION, POWDER, FOR SOLUTION INTRAVENOUS at 08:59

## 2023-09-05 RX ADMIN — TOPIRAMATE 100 MG: 100 TABLET, FILM COATED ORAL at 20:15

## 2023-09-05 RX ADMIN — TOPIRAMATE 100 MG: 100 TABLET, FILM COATED ORAL at 09:00

## 2023-09-05 RX ADMIN — PROCHLORPERAZINE EDISYLATE 10 MG: 5 INJECTION INTRAMUSCULAR; INTRAVENOUS at 05:11

## 2023-09-05 ASSESSMENT — PAIN DESCRIPTION - ORIENTATION
ORIENTATION: RIGHT
ORIENTATION: RIGHT;LOWER

## 2023-09-05 ASSESSMENT — PAIN DESCRIPTION - DESCRIPTORS
DESCRIPTORS: DULL;SHARP
DESCRIPTORS: ACHING;DULL

## 2023-09-05 ASSESSMENT — PAIN SCALES - GENERAL
PAINLEVEL_OUTOF10: 7
PAINLEVEL_OUTOF10: 7
PAINLEVEL_OUTOF10: 3
PAINLEVEL_OUTOF10: 3
PAINLEVEL_OUTOF10: 0
PAINLEVEL_OUTOF10: 7

## 2023-09-05 ASSESSMENT — PAIN DESCRIPTION - PAIN TYPE: TYPE: ACUTE PAIN

## 2023-09-05 ASSESSMENT — PAIN DESCRIPTION - LOCATION
LOCATION: ABDOMEN
LOCATION: ABDOMEN

## 2023-09-05 ASSESSMENT — PAIN DESCRIPTION - ONSET: ONSET: ON-GOING

## 2023-09-05 ASSESSMENT — PAIN - FUNCTIONAL ASSESSMENT: PAIN_FUNCTIONAL_ASSESSMENT: ACTIVITIES ARE NOT PREVENTED

## 2023-09-05 NOTE — CARE COORDINATION
General surgery following for RLQ abscess etiology indeterminate with reaccumulation after IR aspiration. Plan to repeat CT scan today and continue IVABX's. IPTA/ following for discharge planning.

## 2023-09-05 NOTE — PROGRESS NOTES
Comprehensive Nutrition Assessment    Type and Reason for Visit:  Initial, RD Nutrition Re-Screen/LOS    Nutrition Recommendations/Plan:   Resume regular diet when appropriate   Add chocolate/ strawberry ensure BID when able to consume PO   Monitor nutrition adequacy, pertinent labs, bowel habits, wt changes, and clinical progress     Malnutrition Assessment:  Malnutrition Status: At risk for malnutrition (Comment) (09/05/23 1209)    Context:  Acute Illness     Findings of the 6 clinical characteristics of malnutrition:  Energy Intake:  Mild decrease in energy intake (Comment)    Nutrition Assessment:    LOS assessment:  63 y.o. f w/ PMH of GERD, lupus, fibromyalgia, hyperlipidemia, chronic recurrent pancreatitis admitted w/ RLQ Pain/Possible IA Abscess/Sepsis. S/p IR performed CT-guided abscess drain on 8/29, pending abscess fluid results. Pt previously on clear liquid diet, advanced to full liquid diet on on 8/29, regular diet on 8/31, made NPO today for repeat CT today to reassess accumulation. PO intakes previously % of meals. Pt reports good appetite PTA and since admission, eats small measl throughout the day. Wt trending down since admission. Pt reports 10 lb wt loss since admission. Reports nausea improving w/ zofran. Drinks premier protein drinks PTA, willing to trial ensure here. RD to add chocoalte/ strawberry ensure BID when able to consume PO. WIll monitor. Nutrition Related Findings:    Labs reviewed. + BM today. Nausea, zofran. + 9 L since admission Wound Type: None       Current Nutrition Intake & Therapies:    Average Meal Intake: NPO  Average Supplements Intake: NPO  Diet NPO Exceptions are: Sips of Water with Meds, Ice Chips    Anthropometric Measures:  Height: 5' 4\" (162.6 cm)  Ideal Body Weight (IBW): 120 lbs (55 kg)    Admission Body Weight: 146 lb (66.2 kg)  Current Body Weight: 138 lb (62.6 kg), 115 % IBW.  Weight Source: Standing Scale  Current BMI (kg/m2): 23.7        Weight

## 2023-09-05 NOTE — PLAN OF CARE
Problem: Pain  Goal: Verbalizes/displays adequate comfort level or baseline comfort level  Outcome: Progressing  Note: Pt able to use pain scale 0-10, with complaints of right lower abdominal pain, medicated with prn percocet with effective results. Problem: Gastrointestinal - Adult  Goal: Minimal or absence of nausea and vomiting  Outcome: Progressing  Note: Pt without nausea this shift, has prn compazine IV if needed.        Problem: Infection - Adult  Goal: Absence of infection during hospitalization  Outcome: Progressing  Note: WBC WNL, pt afebrile

## 2023-09-06 ENCOUNTER — APPOINTMENT (OUTPATIENT)
Dept: CT IMAGING | Age: 58
End: 2023-09-06
Attending: SURGERY
Payer: MEDICARE

## 2023-09-06 PROCEDURE — 87077 CULTURE AEROBIC IDENTIFY: CPT

## 2023-09-06 PROCEDURE — 87186 SC STD MICRODIL/AGAR DIL: CPT

## 2023-09-06 PROCEDURE — C9113 INJ PANTOPRAZOLE SODIUM, VIA: HCPCS | Performed by: NURSE PRACTITIONER

## 2023-09-06 PROCEDURE — APPSS30 APP SPLIT SHARED TIME 16-30 MINUTES: Performed by: CLINICAL NURSE SPECIALIST

## 2023-09-06 PROCEDURE — 2709999900 CT DRAINAGE VISCERAL PERCUTANEOUS

## 2023-09-06 PROCEDURE — 2060000000 HC ICU INTERMEDIATE R&B

## 2023-09-06 PROCEDURE — 0W9G3ZX DRAINAGE OF PERITONEAL CAVITY, PERCUTANEOUS APPROACH, DIAGNOSTIC: ICD-10-PCS | Performed by: RADIOLOGY

## 2023-09-06 PROCEDURE — 6360000002 HC RX W HCPCS: Performed by: REGISTERED NURSE

## 2023-09-06 PROCEDURE — 87205 SMEAR GRAM STAIN: CPT

## 2023-09-06 PROCEDURE — 87070 CULTURE OTHR SPECIMN AEROBIC: CPT

## 2023-09-06 PROCEDURE — 6360000002 HC RX W HCPCS: Performed by: NURSE PRACTITIONER

## 2023-09-06 PROCEDURE — 6370000000 HC RX 637 (ALT 250 FOR IP): Performed by: INTERNAL MEDICINE

## 2023-09-06 PROCEDURE — 6370000000 HC RX 637 (ALT 250 FOR IP): Performed by: NURSE PRACTITIONER

## 2023-09-06 PROCEDURE — 2580000003 HC RX 258: Performed by: NURSE PRACTITIONER

## 2023-09-06 PROCEDURE — 77012 CT SCAN FOR NEEDLE BIOPSY: CPT

## 2023-09-06 PROCEDURE — 99232 SBSQ HOSP IP/OBS MODERATE 35: CPT | Performed by: SURGERY

## 2023-09-06 PROCEDURE — 86403 PARTICLE AGGLUT ANTBDY SCRN: CPT

## 2023-09-06 PROCEDURE — 6360000002 HC RX W HCPCS: Performed by: RADIOLOGY

## 2023-09-06 RX ORDER — MORPHINE SULFATE 2 MG/ML
2 INJECTION, SOLUTION INTRAMUSCULAR; INTRAVENOUS
Status: COMPLETED | OUTPATIENT
Start: 2023-09-06 | End: 2023-09-06

## 2023-09-06 RX ORDER — SENNA AND DOCUSATE SODIUM 50; 8.6 MG/1; MG/1
2 TABLET, FILM COATED ORAL 2 TIMES DAILY
Status: DISCONTINUED | OUTPATIENT
Start: 2023-09-06 | End: 2023-09-08 | Stop reason: HOSPADM

## 2023-09-06 RX ORDER — MIDAZOLAM HYDROCHLORIDE 1 MG/ML
INJECTION INTRAMUSCULAR; INTRAVENOUS PRN
Status: COMPLETED | OUTPATIENT
Start: 2023-09-06 | End: 2023-09-06

## 2023-09-06 RX ORDER — FENTANYL CITRATE 50 UG/ML
INJECTION, SOLUTION INTRAMUSCULAR; INTRAVENOUS PRN
Status: COMPLETED | OUTPATIENT
Start: 2023-09-06 | End: 2023-09-06

## 2023-09-06 RX ADMIN — MIDAZOLAM 0.5 MG: 1 INJECTION INTRAMUSCULAR; INTRAVENOUS at 15:43

## 2023-09-06 RX ADMIN — SODIUM CHLORIDE, PRESERVATIVE FREE 10 ML: 5 INJECTION INTRAVENOUS at 08:57

## 2023-09-06 RX ADMIN — SENNOSIDES AND DOCUSATE SODIUM 2 TABLET: 50; 8.6 TABLET ORAL at 17:20

## 2023-09-06 RX ADMIN — ATORVASTATIN CALCIUM 20 MG: 10 TABLET, FILM COATED ORAL at 08:56

## 2023-09-06 RX ADMIN — MORPHINE SULFATE 2 MG: 2 INJECTION, SOLUTION INTRAMUSCULAR; INTRAVENOUS at 19:50

## 2023-09-06 RX ADMIN — SENNOSIDES AND DOCUSATE SODIUM 2 TABLET: 50; 8.6 TABLET ORAL at 20:30

## 2023-09-06 RX ADMIN — FENTANYL CITRATE 50 MCG: 50 INJECTION, SOLUTION INTRAMUSCULAR; INTRAVENOUS at 15:40

## 2023-09-06 RX ADMIN — PANTOPRAZOLE SODIUM 40 MG: 40 INJECTION, POWDER, FOR SOLUTION INTRAVENOUS at 08:56

## 2023-09-06 RX ADMIN — MIDAZOLAM 1 MG: 1 INJECTION INTRAMUSCULAR; INTRAVENOUS at 15:41

## 2023-09-06 RX ADMIN — CEFAZOLIN 2000 MG: 10 INJECTION, POWDER, FOR SOLUTION INTRAVENOUS at 04:28

## 2023-09-06 RX ADMIN — MIDAZOLAM 0.5 MG: 1 INJECTION INTRAMUSCULAR; INTRAVENOUS at 15:46

## 2023-09-06 RX ADMIN — TOPIRAMATE 100 MG: 100 TABLET, FILM COATED ORAL at 08:56

## 2023-09-06 RX ADMIN — VENLAFAXINE HYDROCHLORIDE 150 MG: 150 CAPSULE, EXTENDED RELEASE ORAL at 20:30

## 2023-09-06 RX ADMIN — OXYCODONE AND ACETAMINOPHEN 1 TABLET: 5; 325 TABLET ORAL at 08:56

## 2023-09-06 RX ADMIN — CEFAZOLIN 2000 MG: 10 INJECTION, POWDER, FOR SOLUTION INTRAVENOUS at 18:05

## 2023-09-06 RX ADMIN — TOPIRAMATE 100 MG: 100 TABLET, FILM COATED ORAL at 20:30

## 2023-09-06 RX ADMIN — FENTANYL CITRATE 25 MCG: 50 INJECTION, SOLUTION INTRAMUSCULAR; INTRAVENOUS at 15:46

## 2023-09-06 RX ADMIN — FENTANYL CITRATE 25 MCG: 50 INJECTION, SOLUTION INTRAMUSCULAR; INTRAVENOUS at 15:43

## 2023-09-06 RX ADMIN — OXYCODONE AND ACETAMINOPHEN 1 TABLET: 5; 325 TABLET ORAL at 17:19

## 2023-09-06 RX ADMIN — MORPHINE SULFATE 2 MG: 2 INJECTION, SOLUTION INTRAMUSCULAR; INTRAVENOUS at 23:11

## 2023-09-06 ASSESSMENT — PAIN SCALES - GENERAL
PAINLEVEL_OUTOF10: 8
PAINLEVEL_OUTOF10: 0
PAINLEVEL_OUTOF10: 9
PAINLEVEL_OUTOF10: 10
PAINLEVEL_OUTOF10: 0
PAINLEVEL_OUTOF10: 7
PAINLEVEL_OUTOF10: 2
PAINLEVEL_OUTOF10: 0

## 2023-09-06 ASSESSMENT — PAIN DESCRIPTION - ORIENTATION: ORIENTATION: RIGHT;LOWER

## 2023-09-06 ASSESSMENT — PAIN DESCRIPTION - LOCATION
LOCATION: ABDOMEN

## 2023-09-06 NOTE — OR NURSING
Pt arrived for image guided abscess drain insertion right . Procedure explained including the risk and benefits of the procedure. All questions answered. Pt verbalizes understanding of the procedure and states no more questions. Consent confirmed. Vital signs stable. Labs, allergies, medications, and code status reviewed. No contraindications noted. Vital Signs  Vitals:    09/06/23 1538   BP: 128/66   Pulse: 84   Resp: 21   Temp:    SpO2: 96%    (vital signs in table format)    Pre Fartun Score  2 - Able to move 4 extremities voluntarily on command  2 - BP+/- 20mmHg of normal  2 - Fully awake  2 - Able to maintain oxygen saturation >92% on room air  2 - Able to breathe deeply and cough freely    Allergies  Patient has no known allergies.  (allergies)    Labs  Lab Results   Component Value Date    INR 1.14 09/05/2023    PROTIME 14.6 09/05/2023     Lab Results   Component Value Date    CREATININE <0.5 (L) 09/05/2023    BUN 5 (L) 09/05/2023     09/05/2023    K 4.0 09/05/2023     (H) 09/05/2023    CO2 19 (L) 09/05/2023     Lab Results   Component Value Date    WBC 6.4 09/05/2023    HGB 11.3 (L) 09/05/2023    HCT 33.1 (L) 09/05/2023    .0 (H) 09/05/2023     09/05/2023

## 2023-09-06 NOTE — PROGRESS NOTES
09/06/23 1131   Encounter Summary   Encounter Overview/Reason  Initial Encounter   Service Provided For: Patient   Referral/Consult From: Luiz   Last Encounter  09/06/23  (Margie Aguayo visited and prayed with patient)   Assessment/Intervention/Outcome   Assessment Hopeful   Intervention Prayer (assurance of)/[place]

## 2023-09-06 NOTE — PROGRESS NOTES
Hospital Medicine Progress Note      Date of Admission: 8/28/2023  Hospital Day: 10    Chief Admission Complaint:  Direct admit from [facility]); pelvic abscess     Subjective:  Abdominal pain stable. Plan for IR drain placement     Presenting Admission History:        Vaishali Raymundo is a/an 63 y.o. female with a significant past medical history of GERD, lupus, fibromyalgia, hyperlipidemia, with chronic recurrent pancreatitis last known episode about 3 weeks ago presents to [facility] as a direct admit from [facility]) after presenting there earlier today with complaint of right lower quadrant pain for the last 2 days. She notes the pain is severe, 8 out of 10, sharp stabbing pain worse with movement, associated with chills and body aches yesterday and today. She notes nausea without any vomiting, no diarrhea. She notes chronic constipation with last bowel movement yesterday that was soft, light brown without visualized blood. She denies any prior vaginal bleeding or discharge before onset of this RLQ pain. She had a follow-up appointment with her PCP today for inpatient stay at [facility] 2 weeks ago for pancreatitis, provider noted significant right lower quadrant pain and sent her back to [facility] emergency department for suspected appendicitis. Her evaluation included laboratory studies and CT of the abdomen pelvis. CT there showed a cystic structure in the pelvic inlet on the right anterior of the iliac vessels with some dependent free fluid; concerning for ovarian cysts remnants, measuring 4.7 cm.       Assessment/Plan:      Current Principal Problem:  Intra-abdominal abscess (HCC)    Sepsis 2/2 Pelvic Abscess  - CT A/P showing 4.7cm cystic structure in the right lower pelvis, consistent with ovarian cyst though is post-hysterectomy  - +SIRS with low MAP and leukocytosis PTA  - General surgery following  - s/p IR performed [x]No    ------------------------------------------------------------------------------------------------------------------------------------------------------------------------    MDM    Moderate    Medications:  Personally reviewed in detail in conjunction w/ labs as documented for evidence of drug toxicity. Infusion Medications    sodium chloride 5 mL/hr at 09/04/23 1757     Scheduled Medications    sodium chloride flush  5-40 mL IntraVENous 2 times per day    pantoprazole  40 mg IntraVENous Daily    ceFAZolin  2,000 mg IntraVENous Q8H    atorvastatin  20 mg Oral Daily    topiramate  100 mg Oral BID    venlafaxine  150 mg Oral Nightly     PRN Meds: diatrizoate meglumine-sodium, polyethylene glycol, oxyCODONE-acetaminophen, sodium chloride flush, sodium chloride, acetaminophen **OR** acetaminophen, fentanNYL, prochlorperazine, hydrOXYzine HCl     Labs:  Personally reviewed and interpreted for clinical significance. Recent Labs     09/04/23 0512 09/05/23 0447   WBC 6.9 6.4   HGB 11.7* 11.3*   HCT 33.8* 33.1*    354       Recent Labs     09/04/23 0512 09/05/23  0448    142   K 3.3* 4.0   * 112*   CO2 20* 19*   BUN 5* 5*   CREATININE <0.5* <0.5*   CALCIUM 8.8 9.0   PHOS 3.5 3.3       No results for input(s): PROBNP, TROPHS in the last 72 hours. No results for input(s): LABA1C in the last 72 hours. No results for input(s): AST, ALT, BILIDIR, BILITOT, ALKPHOS in the last 72 hours. Recent Labs     09/05/23  0447   INR 1.14         Urine Cultures: No results found for: LABURIN  Blood Cultures:   Lab Results   Component Value Date/Time    BC No Growth after 4 days of incubation. 08/29/2023 12:04 AM     Lab Results   Component Value Date/Time    BLOODCULT2 No Growth after 4 days of incubation.  08/29/2023 12:05 AM     Organism:   Lab Results   Component Value Date/Time    ORG Staphylococcus aureus 08/29/2023 04:10 PM         Edie Saldaña MD

## 2023-09-06 NOTE — OR NURSING
Image guided abscess drain insertion right completed. Dr. Lupe Mcdaniel placed 8 Amharic 25 cm AngiodynamOleOle Exodus Array multipurpose drainage catheter LOT # [id number] EXP 2/28/2026 in the right. Drain/tube secured at the 16 cm line with sutures. 6 milliliters of bloody colored withdrawn immediately. Specimen sent to lab for culture. Drain/tube dressing clean, dry, and intact. Pt tolerated procedure without any signs or symptoms of distress. Vital signs stable. Report called to  RN. Pt transported back to [facility] in stable condition via bed by transport.      Medications  Versed: 2 mg  Fentanyl: 100 mcg    Vital Signs  Vitals:    09/06/23 1553   BP: 131/83   Pulse: 87   Resp: 16   Temp:    SpO2: 96%    (vital signs in table format)    Post Fartun  2 - Able to move 4 extremities voluntarily on command  2 - BP+/- 20mmHg of normal  2 - Fully awake  2 - Able to maintain oxygen saturation >92% on room air  2 - Able to breathe deeply and cough freely

## 2023-09-06 NOTE — PLAN OF CARE
Problem: Discharge Planning  Goal: Discharge to home or other facility with appropriate resources  Outcome: Progressing     Problem: Pain  Goal: Verbalizes/displays adequate comfort level or baseline comfort level  9/6/2023 0152 by Cyrus Lino RN  Outcome: Progressing  9/5/2023 1722 by Peter Maddox RN  Outcome: Progressing  Note: Pt able to use pain scale 0-10, with complaints of right lower abdominal pain, medicated with prn percocet with effective results. Problem: Cardiovascular - Adult  Goal: Maintains optimal cardiac output and hemodynamic stability  Outcome: Progressing     Problem: Skin/Tissue Integrity - Adult  Goal: Skin integrity remains intact  Outcome: Progressing  Flowsheets (Taken 9/6/2023 0047)  Skin Integrity Remains Intact:   Monitor for areas of redness and/or skin breakdown   Assess vascular access sites hourly   Every 4-6 hours minimum: Change oxygen saturation probe site   Every 4-6 hours: If on nasal continuous positive airway pressure, respiratory therapy assesses nares and determine need for appliance change or resting period     Problem: Musculoskeletal - Adult  Goal: Return mobility to safest level of function  Outcome: Progressing  Goal: Return ADL status to a safe level of function  Outcome: Progressing     Problem: Gastrointestinal - Adult  Goal: Minimal or absence of nausea and vomiting  9/6/2023 0152 by Cyrus Lino RN  Outcome: Progressing  9/5/2023 1722 by Peter Maddox RN  Outcome: Progressing  Note: Pt without nausea this shift, has prn compazine IV if needed.     Goal: Maintains or returns to baseline bowel function  Outcome: Progressing  Goal: Maintains adequate nutritional intake  Outcome: Progressing     Problem: Infection - Adult  Goal: Absence of infection at discharge  Outcome: Progressing  Goal: Absence of infection during hospitalization  9/6/2023 0152 by Cyrus Lino RN  Outcome: Progressing  9/5/2023 1722 by Peter Maddox RN  Outcome: Progressing  Note: WBC WNL, pt afebrile      Problem: Metabolic/Fluid and Electrolytes - Adult  Goal: Electrolytes maintained within normal limits  Outcome: Progressing  Goal: Hemodynamic stability and optimal renal function maintained  Outcome: Progressing     Problem: Hematologic - Adult  Goal: Maintains hematologic stability  Outcome: Progressing     Problem: Safety - Adult  Goal: Free from fall injury  Outcome: Progressing     Problem: Nutrition Deficit:  Goal: Optimize nutritional status  Outcome: Progressing

## 2023-09-06 NOTE — OR NURSING
Time out completed prior to procedure. Pt was place supine on the CT table. Pt was placed on all cardiac monitoring.

## 2023-09-07 PROCEDURE — 2580000003 HC RX 258: Performed by: NURSE PRACTITIONER

## 2023-09-07 PROCEDURE — 2060000000 HC ICU INTERMEDIATE R&B

## 2023-09-07 PROCEDURE — 99231 SBSQ HOSP IP/OBS SF/LOW 25: CPT | Performed by: SURGERY

## 2023-09-07 PROCEDURE — C9113 INJ PANTOPRAZOLE SODIUM, VIA: HCPCS | Performed by: NURSE PRACTITIONER

## 2023-09-07 PROCEDURE — 6370000000 HC RX 637 (ALT 250 FOR IP): Performed by: NURSE PRACTITIONER

## 2023-09-07 PROCEDURE — 6360000002 HC RX W HCPCS: Performed by: NURSE PRACTITIONER

## 2023-09-07 PROCEDURE — 6370000000 HC RX 637 (ALT 250 FOR IP): Performed by: INTERNAL MEDICINE

## 2023-09-07 RX ADMIN — PROCHLORPERAZINE EDISYLATE 10 MG: 5 INJECTION INTRAMUSCULAR; INTRAVENOUS at 23:17

## 2023-09-07 RX ADMIN — PROCHLORPERAZINE EDISYLATE 10 MG: 5 INJECTION INTRAMUSCULAR; INTRAVENOUS at 13:43

## 2023-09-07 RX ADMIN — CEFAZOLIN 2000 MG: 10 INJECTION, POWDER, FOR SOLUTION INTRAVENOUS at 01:43

## 2023-09-07 RX ADMIN — SODIUM CHLORIDE 5 ML: 9 INJECTION, SOLUTION INTRAVENOUS at 20:26

## 2023-09-07 RX ADMIN — SENNOSIDES AND DOCUSATE SODIUM 2 TABLET: 50; 8.6 TABLET ORAL at 09:14

## 2023-09-07 RX ADMIN — OXYCODONE AND ACETAMINOPHEN 1 TABLET: 5; 325 TABLET ORAL at 09:29

## 2023-09-07 RX ADMIN — PANTOPRAZOLE SODIUM 40 MG: 40 INJECTION, POWDER, FOR SOLUTION INTRAVENOUS at 09:14

## 2023-09-07 RX ADMIN — OXYCODONE AND ACETAMINOPHEN 1 TABLET: 5; 325 TABLET ORAL at 13:43

## 2023-09-07 RX ADMIN — SODIUM CHLORIDE 80 ML: 9 INJECTION, SOLUTION INTRAVENOUS at 01:42

## 2023-09-07 RX ADMIN — CEFAZOLIN 2000 MG: 10 INJECTION, POWDER, FOR SOLUTION INTRAVENOUS at 20:27

## 2023-09-07 RX ADMIN — CEFAZOLIN 2000 MG: 10 INJECTION, POWDER, FOR SOLUTION INTRAVENOUS at 09:27

## 2023-09-07 RX ADMIN — TOPIRAMATE 100 MG: 100 TABLET, FILM COATED ORAL at 20:27

## 2023-09-07 RX ADMIN — OXYCODONE AND ACETAMINOPHEN 1 TABLET: 5; 325 TABLET ORAL at 20:31

## 2023-09-07 RX ADMIN — ATORVASTATIN CALCIUM 20 MG: 10 TABLET, FILM COATED ORAL at 09:14

## 2023-09-07 RX ADMIN — OXYCODONE AND ACETAMINOPHEN 1 TABLET: 5; 325 TABLET ORAL at 05:31

## 2023-09-07 RX ADMIN — VENLAFAXINE HYDROCHLORIDE 150 MG: 150 CAPSULE, EXTENDED RELEASE ORAL at 20:27

## 2023-09-07 RX ADMIN — SENNOSIDES AND DOCUSATE SODIUM 2 TABLET: 50; 8.6 TABLET ORAL at 20:27

## 2023-09-07 RX ADMIN — TOPIRAMATE 100 MG: 100 TABLET, FILM COATED ORAL at 09:14

## 2023-09-07 ASSESSMENT — PAIN DESCRIPTION - ORIENTATION
ORIENTATION: RIGHT;LOWER
ORIENTATION: LOWER;RIGHT

## 2023-09-07 ASSESSMENT — PAIN DESCRIPTION - DESCRIPTORS
DESCRIPTORS: SHOOTING
DESCRIPTORS: SHOOTING

## 2023-09-07 ASSESSMENT — PAIN DESCRIPTION - PAIN TYPE: TYPE: ACUTE PAIN

## 2023-09-07 ASSESSMENT — PAIN SCALES - GENERAL
PAINLEVEL_OUTOF10: 7
PAINLEVEL_OUTOF10: 6
PAINLEVEL_OUTOF10: 6

## 2023-09-07 ASSESSMENT — PAIN DESCRIPTION - LOCATION
LOCATION: ABDOMEN

## 2023-09-07 ASSESSMENT — PAIN DESCRIPTION - FREQUENCY: FREQUENCY: INTERMITTENT

## 2023-09-07 NOTE — PROGRESS NOTES
Hospital Medicine Progress Note      Date of Admission: 8/28/2023  Hospital Day: 11    Chief Admission Complaint:  Direct admit from [facility]); pelvic abscess     Subjective:  Abdominal pain stable. Ongoing drain output. Presenting Admission History:        Tamela Diaz is a/an 63 y.o. female with a significant past medical history of GERD, lupus, fibromyalgia, hyperlipidemia, with chronic recurrent pancreatitis last known episode about 3 weeks ago presents to [facility] as a direct admit from [facility]) after presenting there earlier today with complaint of right lower quadrant pain for the last 2 days. She notes the pain is severe, 8 out of 10, sharp stabbing pain worse with movement, associated with chills and body aches yesterday and today. She notes nausea without any vomiting, no diarrhea. She notes chronic constipation with last bowel movement yesterday that was soft, light brown without visualized blood. She denies any prior vaginal bleeding or discharge before onset of this RLQ pain. She had a follow-up appointment with her PCP today for inpatient stay at [facility] 2 weeks ago for pancreatitis, provider noted significant right lower quadrant pain and sent her back to [facility] emergency department for suspected appendicitis. Her evaluation included laboratory studies and CT of the abdomen pelvis. CT there showed a cystic structure in the pelvic inlet on the right anterior of the iliac vessels with some dependent free fluid; concerning for ovarian cysts remnants, measuring 4.7 cm.       Assessment/Plan:      Current Principal Problem:  Intra-abdominal abscess (HCC)    Sepsis 2/2 Pelvic Abscess  - CT A/P showing 4.7cm cystic structure in the right lower pelvis, consistent with ovarian cyst though is post-hysterectomy  - +SIRS with low MAP and leukocytosis PTA  - General surgery following  - s/p IR performed

## 2023-09-07 NOTE — PLAN OF CARE
Problem: Gastrointestinal - Adult  Goal: Maintains or returns to baseline bowel function  Outcome: Progressing     Problem: Musculoskeletal - Adult  Goal: Return ADL status to a safe level of function  Outcome: Progressing     Problem: Skin/Tissue Integrity - Adult  Goal: Skin integrity remains intact  Outcome: Progressing     Problem: Infection - Adult  Goal: Absence of infection at discharge  Outcome: Progressing

## 2023-09-08 VITALS
BODY MASS INDEX: 23.23 KG/M2 | HEIGHT: 64 IN | DIASTOLIC BLOOD PRESSURE: 79 MMHG | RESPIRATION RATE: 22 BRPM | TEMPERATURE: 98.1 F | OXYGEN SATURATION: 100 % | HEART RATE: 96 BPM | WEIGHT: 136.1 LBS | SYSTOLIC BLOOD PRESSURE: 112 MMHG

## 2023-09-08 LAB
BACTERIA FLD AEROBE CULT: ABNORMAL
GRAM STN SPEC: ABNORMAL
ORGANISM: ABNORMAL

## 2023-09-08 PROCEDURE — 6370000000 HC RX 637 (ALT 250 FOR IP): Performed by: INTERNAL MEDICINE

## 2023-09-08 PROCEDURE — 6370000000 HC RX 637 (ALT 250 FOR IP): Performed by: NURSE PRACTITIONER

## 2023-09-08 PROCEDURE — APPSS30 APP SPLIT SHARED TIME 16-30 MINUTES: Performed by: CLINICAL NURSE SPECIALIST

## 2023-09-08 PROCEDURE — 2580000003 HC RX 258: Performed by: NURSE PRACTITIONER

## 2023-09-08 PROCEDURE — 6360000002 HC RX W HCPCS: Performed by: NURSE PRACTITIONER

## 2023-09-08 PROCEDURE — 99231 SBSQ HOSP IP/OBS SF/LOW 25: CPT | Performed by: SURGERY

## 2023-09-08 PROCEDURE — C9113 INJ PANTOPRAZOLE SODIUM, VIA: HCPCS | Performed by: NURSE PRACTITIONER

## 2023-09-08 RX ORDER — SENNA AND DOCUSATE SODIUM 50; 8.6 MG/1; MG/1
2 TABLET, FILM COATED ORAL 2 TIMES DAILY PRN
Qty: 30 TABLET | Refills: 0 | Status: SHIPPED | OUTPATIENT
Start: 2023-09-08

## 2023-09-08 RX ORDER — OXYCODONE HYDROCHLORIDE AND ACETAMINOPHEN 5; 325 MG/1; MG/1
1 TABLET ORAL EVERY 6 HOURS PRN
Qty: 28 TABLET | Refills: 0 | Status: SHIPPED | OUTPATIENT
Start: 2023-09-08 | End: 2023-09-15

## 2023-09-08 RX ORDER — AMOXICILLIN AND CLAVULANATE POTASSIUM 875; 125 MG/1; MG/1
1 TABLET, FILM COATED ORAL 2 TIMES DAILY
Qty: 14 TABLET | Refills: 0 | Status: SHIPPED | OUTPATIENT
Start: 2023-09-08 | End: 2023-09-18 | Stop reason: SDUPTHER

## 2023-09-08 RX ADMIN — SODIUM CHLORIDE 90 ML: 9 INJECTION, SOLUTION INTRAVENOUS at 02:32

## 2023-09-08 RX ADMIN — ATORVASTATIN CALCIUM 20 MG: 10 TABLET, FILM COATED ORAL at 09:42

## 2023-09-08 RX ADMIN — PANTOPRAZOLE SODIUM 40 MG: 40 INJECTION, POWDER, FOR SOLUTION INTRAVENOUS at 09:42

## 2023-09-08 RX ADMIN — TOPIRAMATE 100 MG: 100 TABLET, FILM COATED ORAL at 09:42

## 2023-09-08 RX ADMIN — OXYCODONE AND ACETAMINOPHEN 1 TABLET: 5; 325 TABLET ORAL at 06:37

## 2023-09-08 RX ADMIN — SENNOSIDES AND DOCUSATE SODIUM 2 TABLET: 50; 8.6 TABLET ORAL at 09:42

## 2023-09-08 RX ADMIN — OXYCODONE AND ACETAMINOPHEN 1 TABLET: 5; 325 TABLET ORAL at 10:24

## 2023-09-08 RX ADMIN — CEFAZOLIN 2000 MG: 10 INJECTION, POWDER, FOR SOLUTION INTRAVENOUS at 10:19

## 2023-09-08 RX ADMIN — CEFAZOLIN 2000 MG: 10 INJECTION, POWDER, FOR SOLUTION INTRAVENOUS at 02:34

## 2023-09-08 RX ADMIN — SODIUM CHLORIDE, PRESERVATIVE FREE 10 ML: 5 INJECTION INTRAVENOUS at 09:42

## 2023-09-08 ASSESSMENT — PAIN SCALES - GENERAL
PAINLEVEL_OUTOF10: 7
PAINLEVEL_OUTOF10: 8
PAINLEVEL_OUTOF10: 2
PAINLEVEL_OUTOF10: 7

## 2023-09-08 ASSESSMENT — PAIN DESCRIPTION - LOCATION
LOCATION: ABDOMEN
LOCATION: ABDOMEN

## 2023-09-08 NOTE — CARE COORDINATION
IR placed a drain on 9/6. Likely home on PO abx soon per MD progress notes. Discussed home care with patient. She is not currently established with PCP because her old PCP is through [address] [place] [address]. However she is now living in [place] and plans to see a nurse practitioner in [place] to get established with new PCP. Unable to arrange home care without an active PCP and patient declines home care stating she can mange the drain. IPTA and plans to return home. Anticipate D/C home. Patient has ride.

## 2023-09-08 NOTE — PROGRESS NOTES
Patient discharged home with daughter. Patient states no further needs or questions after reviewing AVS with writer. Patient's LDAs and tele removed. [facility] and [facility] notified of discharge. Patient sent with personal belongings.

## 2023-09-08 NOTE — PLAN OF CARE
Problem: Discharge Planning  Goal: Discharge to home or other facility with appropriate resources  Outcome: Adequate for Discharge     Problem: Pain  Goal: Verbalizes/displays adequate comfort level or baseline comfort level  Outcome: Adequate for Discharge     Problem: Cardiovascular - Adult  Goal: Maintains optimal cardiac output and hemodynamic stability  Outcome: Adequate for Discharge     Problem: Skin/Tissue Integrity - Adult  Goal: Skin integrity remains intact  Outcome: Adequate for Discharge     Problem: Musculoskeletal - Adult  Goal: Return mobility to safest level of function  Outcome: Adequate for Discharge  Goal: Return ADL status to a safe level of function  Outcome: Adequate for Discharge     Problem: Gastrointestinal - Adult  Goal: Minimal or absence of nausea and vomiting  Outcome: Adequate for Discharge  Goal: Maintains or returns to baseline bowel function  Outcome: Adequate for Discharge  Goal: Maintains adequate nutritional intake  Outcome: Adequate for Discharge     Problem: Infection - Adult  Goal: Absence of infection at discharge  Outcome: Adequate for Discharge  Goal: Absence of infection during hospitalization  Outcome: Adequate for Discharge     Problem: Metabolic/Fluid and Electrolytes - Adult  Goal: Electrolytes maintained within normal limits  Outcome: Adequate for Discharge  Goal: Hemodynamic stability and optimal renal function maintained  Outcome: Adequate for Discharge     Problem: Hematologic - Adult  Goal: Maintains hematologic stability  Outcome: Adequate for Discharge     Problem: Safety - Adult  Goal: Free from fall injury  Outcome: Adequate for Discharge     Problem: Nutrition Deficit:  Goal: Optimize nutritional status  Outcome: Adequate for Discharge

## 2023-09-08 NOTE — FLOWSHEET NOTE
09/08/23 0802   Assessment   Charting Type Shift assessment   Psychosocial   Psychosocial (WDL) WDL   Neurological   Neuro (WDL) WDL   Level of Consciousness 0   [place] Coma Scale   Eye Opening 4   Best Verbal Response 5   Best Motor Response 6   [place] Coma Scale Score 15   HEENT (Head, Ears, Eyes, Nose, & Throat)   Right Eye Impaired vision;Glasses   Left Eye Impaired vision;Glasses   Respiratory   Respiratory (WDL) WDL   Breath Sounds   Right Upper Lobe Clear   Right Middle Lobe Clear   Right Lower Lobe Clear   Left Upper Lobe Clear   Left Lower Lobe Clear   Cardiac   Cardiac (WDL) WDL   Cardiac Regularity Regular   Heart Sounds S1, S2   Cardiac Rhythm Sinus rhythm   Rhythm Interpretation   Pulse (!) 104   Gastrointestinal   Abdominal (WDL) X   Abdomen Inspection Soft;Flat;Other (Comment)   RUQ Bowel Sounds Active   LUQ Bowel Sounds Active   RLQ Bowel Sounds Active   LLQ Bowel Sounds Active   Tenderness RLQ;Tenderness; Soft   Genitourinary   Genitourinary (WDL) WDL   Suprapubic Tenderness No   Dysuria (Pain/Burning w/Urination) No   Difficulty Urinating/Starting Stream No   Peripheral Vascular   Peripheral Vascular (WDL) WDL   RUE Neurovascular Assessment   Capillary Refill Less than/Equal to 3 seconds   Color Appropriate for Ethnicity   Temperature Warm   R Brachial Pulse +2 (Moderate)   LUE Neurovascular Assessment   Capillary Refill Less than/Equal to 3 seconds   Color Appropriate for Ethnicity   Temperature Warm   L Brachial Pulse +2 (Moderate)

## 2023-09-08 NOTE — PLAN OF CARE
Problem: Discharge Planning  Goal: Discharge to home or other facility with appropriate resources  9/8/2023 1417 by Re Oscar RN  Outcome: Completed  9/8/2023 1415 by Re Oscar RN  Outcome: Adequate for Discharge     Problem: Pain  Goal: Verbalizes/displays adequate comfort level or baseline comfort level  9/8/2023 1417 by Re Oscar RN  Outcome: Completed  9/8/2023 1415 by Re Oscar RN  Outcome: Adequate for Discharge     Problem: Cardiovascular - Adult  Goal: Maintains optimal cardiac output and hemodynamic stability  9/8/2023 1417 by Re Oscar RN  Outcome: Completed  9/8/2023 1415 by Re Oscar RN  Outcome: Adequate for Discharge     Problem: Skin/Tissue Integrity - Adult  Goal: Skin integrity remains intact  9/8/2023 1417 by Re Oscar RN  Outcome: Completed  9/8/2023 1415 by Re Oscar RN  Outcome: Adequate for Discharge     Problem: Musculoskeletal - Adult  Goal: Return mobility to safest level of function  9/8/2023 1417 by Re Oscar RN  Outcome: Completed  9/8/2023 1415 by Re Oscar RN  Outcome: Adequate for Discharge  Goal: Return ADL status to a safe level of function  9/8/2023 1417 by Re Oscar RN  Outcome: Completed  9/8/2023 1415 by Re Oscar RN  Outcome: Adequate for Discharge     Problem: Gastrointestinal - Adult  Goal: Minimal or absence of nausea and vomiting  9/8/2023 1417 by Re Oscar RN  Outcome: Completed  9/8/2023 1415 by Re Oscar RN  Outcome: Adequate for Discharge  Goal: Maintains or returns to baseline bowel function  9/8/2023 1417 by Re Oscar RN  Outcome: Completed  9/8/2023 1415 by Re Oscar RN  Outcome: Adequate for Discharge  Goal: Maintains adequate nutritional intake  9/8/2023 1417 by Re Oscar RN  Outcome: Completed  9/8/2023 1415 by Re Oscar RN  Outcome: Adequate for Discharge     Problem: Infection - Adult  Goal: Absence of infection at discharge  9/8/2023 1417 by León Britton RN  Outcome: Completed  9/8/2023 1415 by León Britton RN  Outcome: Adequate for Discharge  Goal: Absence of infection during hospitalization  9/8/2023 1417 by León Britton RN  Outcome: Completed  9/8/2023 1415 by León Britton RN  Outcome: Adequate for Discharge     Problem: Metabolic/Fluid and Electrolytes - Adult  Goal: Electrolytes maintained within normal limits  9/8/2023 1417 by León Britton RN  Outcome: Completed  9/8/2023 1415 by León Britton RN  Outcome: Adequate for Discharge  Goal: Hemodynamic stability and optimal renal function maintained  9/8/2023 1417 by León Britton RN  Outcome: Completed  9/8/2023 1415 by León Britton RN  Outcome: Adequate for Discharge     Problem: Hematologic - Adult  Goal: Maintains hematologic stability  9/8/2023 1417 by León Britton RN  Outcome: Completed  9/8/2023 1415 by León Britton RN  Outcome: Adequate for Discharge     Problem: Safety - Adult  Goal: Free from fall injury  9/8/2023 1417 by León Britton RN  Outcome: Completed  9/8/2023 1415 by León Britton RN  Outcome: Adequate for Discharge     Problem: Nutrition Deficit:  Goal: Optimize nutritional status  9/8/2023 1417 by León Britton RN  Outcome: Completed  9/8/2023 1415 by León Britton RN  Outcome: Adequate for Discharge

## 2023-09-08 NOTE — PROGRESS NOTES
Hospital Medicine Progress Note      Date of Admission: 8/28/2023  Hospital Day: 12    Chief Admission Complaint:  Direct admit from [facility]); pelvic abscess     Subjective:  Abdominal pain stable. Ongoing drain output. Presenting Admission History:        Ivon Barker is a/an 63 y.o. female with a significant past medical history of GERD, lupus, fibromyalgia, hyperlipidemia, with chronic recurrent pancreatitis last known episode about 3 weeks ago presents to [facility] as a direct admit from [facility]) after presenting there earlier today with complaint of right lower quadrant pain for the last 2 days. She notes the pain is severe, 8 out of 10, sharp stabbing pain worse with movement, associated with chills and body aches yesterday and today. She notes nausea without any vomiting, no diarrhea. She notes chronic constipation with last bowel movement yesterday that was soft, light brown without visualized blood. She denies any prior vaginal bleeding or discharge before onset of this RLQ pain. She had a follow-up appointment with her PCP today for inpatient stay at [facility] 2 weeks ago for pancreatitis, provider noted significant right lower quadrant pain and sent her back to [facility] emergency department for suspected appendicitis. Her evaluation included laboratory studies and CT of the abdomen pelvis. CT there showed a cystic structure in the pelvic inlet on the right anterior of the iliac vessels with some dependent free fluid; concerning for ovarian cysts remnants, measuring 4.7 cm.       Assessment/Plan:      Current Principal Problem:  Intra-abdominal abscess (HCC)    Sepsis 2/2 Pelvic Abscess  - CT A/P showing 4.7cm cystic structure in the right lower pelvis, consistent with ovarian cyst though is post-hysterectomy  - +SIRS with low MAP and leukocytosis PTA  - General surgery following  - s/p IR performed

## 2023-09-17 NOTE — PROGRESS NOTES
Physician Progress Note      Jose Coyne  CSN #:                  [id number]  :                       1965  ADMIT DATE:       2023 10:49 PM  [facility] DATE:        2023 2:51 PM  RESPONDING  PROVIDER #:        Stacie Harris MD          QUERY TEXT:    Patient admitted with RLQ pain. Noted documentation of intraabdominal   abscess and pelvic abscess. If possible, please document in progress notes and discharge summary if you   are evaluating and /or treating any of the following: The medical record reflects the following:  Risk Factors: hx of hysterectomy and cholecystectomy  Clinical Indicators: documentation of intraabdominal abscess and pelvic   abscess - per attending progress note, \"CT A/P showing 4.7cm cystic structure   in the right lower pelvis, consistent with ovarian cyst though is   post-hysterectomy\"  Treatment: abscess drain placement, IV ABX, surgery consult, CT of ABD  Options provided:  -- after study intraabdominal abscess confirmed  -- after study pelvic abscess confirmed  -- Other - I will add my own diagnosis  -- Disagree - Not applicable / Not valid  -- Disagree - Clinically unable to determine / Unknown  -- Refer to Clinical Documentation Reviewer    PROVIDER RESPONSE TEXT:    after study pelvic abscess confirmed. Query created by: Angley Rainey on 9/15/2023 1:43 PM      Electronically signed by:   Stacie Harris MD 2023 5:44 PM

## 2023-09-18 ENCOUNTER — OFFICE VISIT (OUTPATIENT)
Dept: SURGERY | Age: 58
End: 2023-09-18
Payer: MEDICARE

## 2023-09-18 VITALS
DIASTOLIC BLOOD PRESSURE: 74 MMHG | HEIGHT: 64 IN | SYSTOLIC BLOOD PRESSURE: 128 MMHG | WEIGHT: 141.6 LBS | BODY MASS INDEX: 24.17 KG/M2

## 2023-09-18 DIAGNOSIS — K65.1 INTRA-ABDOMINAL ABSCESS (HCC): Primary | ICD-10-CM

## 2023-09-18 PROCEDURE — G8420 CALC BMI NORM PARAMETERS: HCPCS | Performed by: SURGERY

## 2023-09-18 PROCEDURE — 99213 OFFICE O/P EST LOW 20 MIN: CPT | Performed by: SURGERY

## 2023-09-18 PROCEDURE — 1111F DSCHRG MED/CURRENT MED MERGE: CPT | Performed by: SURGERY

## 2023-09-18 PROCEDURE — 1036F TOBACCO NON-USER: CPT | Performed by: SURGERY

## 2023-09-18 PROCEDURE — G8427 DOCREV CUR MEDS BY ELIG CLIN: HCPCS | Performed by: SURGERY

## 2023-09-18 PROCEDURE — 3017F COLORECTAL CA SCREEN DOC REV: CPT | Performed by: SURGERY

## 2023-09-18 RX ORDER — AMOXICILLIN AND CLAVULANATE POTASSIUM 875; 125 MG/1; MG/1
1 TABLET, FILM COATED ORAL 2 TIMES DAILY
Qty: 14 TABLET | Refills: 0 | Status: SHIPPED | OUTPATIENT
Start: 2023-09-18 | End: 2023-09-25